# Patient Record
Sex: FEMALE | Race: WHITE | NOT HISPANIC OR LATINO | Employment: OTHER | URBAN - METROPOLITAN AREA
[De-identification: names, ages, dates, MRNs, and addresses within clinical notes are randomized per-mention and may not be internally consistent; named-entity substitution may affect disease eponyms.]

---

## 2024-01-05 ENCOUNTER — TRANSFERRED RECORDS (OUTPATIENT)
Dept: HEALTH INFORMATION MANAGEMENT | Facility: CLINIC | Age: 74
End: 2024-01-05

## 2024-02-05 ENCOUNTER — TRANSFERRED RECORDS (OUTPATIENT)
Dept: HEALTH INFORMATION MANAGEMENT | Facility: CLINIC | Age: 74
End: 2024-02-05
Payer: MEDICARE

## 2024-02-26 ENCOUNTER — TRANSFERRED RECORDS (OUTPATIENT)
Dept: HEALTH INFORMATION MANAGEMENT | Facility: CLINIC | Age: 74
End: 2024-02-26
Payer: MEDICARE

## 2024-02-27 ENCOUNTER — TRANSCRIBE ORDERS (OUTPATIENT)
Dept: OTHER | Age: 74
End: 2024-02-27

## 2024-02-27 DIAGNOSIS — I27.20 PULMONARY HYPERTENSION (H): Primary | ICD-10-CM

## 2024-02-28 ENCOUNTER — PRE VISIT (OUTPATIENT)
Dept: CARDIOLOGY | Facility: CLINIC | Age: 74
End: 2024-02-28
Payer: MEDICARE

## 2024-02-28 NOTE — TELEPHONE ENCOUNTER
Patient Name: Evelyne Cutler Age: 73 year old, female, 1950 MRN: [id number]   Referring Provider:  Edie Appt:         PH Medications:  Sildenafil      Patient History: Pt has a history of permanent atrial fibrillation status post left atrial appendage occluder device, hypertension, chronic kidney disease stage III, GAVE, iron deficiency anemia, former tobacco abuse, severe pulmonary hypertension and severe tricuspid regurgitation       Recently admitted 2/21 for acute heart failure. Inpatient testing for PH completed and diuresis. Dry weight: 164lb (was at 202lb). Patient was having hypotension with sildenafil start and diuresis.     Home oxygen: 1-2lpm. Formerly [facility] medical (ordered at discharge)      Recent Testing:       Date Test Epic Media/Scan Care Everywhere    2/23/24 RHC             111/58/81, RA 12, RV 75/12, PA 75/30/45, PCWP 15. SvO2: 47   CO/CI 4.6/2.5. PVR 6.5 MONROE     Vasodilator (Flolan 0.008 ng/kg/min): NIBP 89/49/66, PA 70/25/40, PCWP   15. SvO2: 50, CO/CI 4.9/2.7. PVR 5.1 MONROE.  []  []   [x]     2/23/24 Echo               * The estimated ejection fraction is 65-70%.     * Left ventricular segmental wall motion is normal.     * The right ventricle is moderately enlarged.     * Moderately reduced right ventricular systolic function.     * There is severe tricuspid regurgitation.     * Severe pulmonary hypertension, estimated pulmonary arterial systolic   pressure is  102 mmHg.     * Prior study from 1/10/2024.  Vyiy-iy-jckn comparison EF still 65%, RV   is   moderately enlarged with moderately reduced function PA pressures still   severely elevated at 100 prior study PA pressures were 90 with dilated RV   with   decreased RV function.                            []  []   [x]     2/21/24 Sleep Study         The patient was monitored for 9 hours and 8 minutes.  The patient's saturations were below 90% for 15.9% of the tested time.  Supplemental oxygen was added.  Some cyclic desaturations  were noted, potentially suggestive of a sleep-related breathing disorder.  The patient ended up on 1 L/minutes nasal cannula.  []  []   [x]     2/22/24 Chest CT        1. Findings of congestive heart failure   2. Negative for pulmonary fibrosis.  The minor atelectasis and bronchiectasis   doubtful clinical relevance   3. Micronodules do not require follow-up unless the patient high risk than 12   month but these are of doubtful clinical relevance   4. The body wall edema, pleural effusions and ascites reflecting the 3rd   spacing and CHF   5. Pulmonary artery hypertension, cardiomegaly and left ventricle exclusion   device   6. Hypoattenuation of liver consistent with steatosis. This associates with   metabolic syndrome, hyperinsulinemia, predisposing to steatohepatitis and   sequelae including cirrhosis and HCC.    []  []   [x]     2/22/24 V/Q Scan    IMPRESSION:   1. Abnormal clumped radiotracer on ventilation scan which may be secondary to   COPD.   2. Multiple matched segmental the fax within the bilateral lungs.  However   distribution favors artifact secondary to retained radiotracer from ventilation   scan rather than true perfusion defects.  []   []   [x]      Abd/Liver US []   []   []      Misc:  []   []   []         []   []   []

## 2024-03-01 ENCOUNTER — PRE VISIT (OUTPATIENT)
Dept: CARDIOLOGY | Facility: CLINIC | Age: 74
End: 2024-03-01
Payer: MEDICARE

## 2024-03-01 NOTE — TELEPHONE ENCOUNTER
RECORDS RECEIVED FROM:    DATE RECEIVED:    GENERAL RECORDS STATUS DETAILS   OFFICE NOTE from cardiologists Care Everywhere 11-29-23 [place]   EKG (STRIPS & REPORTS) Received 2-5-24   ECHOS (IMAGES AND REPORTS) Received 2-23-24   PULMONARY HYPERTENSION      6 MINUTE WALK TEST N/A    PULMONARY FUNCTION TESTS N/A    RIGHT HEART CATH (IMAGES) Received 2-26-24   SLEEP STUDY / OVERNIGHT OXIMETRY Received 2-21-24   XR CHEST   (IMAGES AND REPORTS) Received 2-15-24   CHEST CT  (IMAGES AND REPORTS) Received 2-22-24   V/Q SCAN (IMAGES) Received 2-22-24   LIVER US  (IMAGES AND REPORTS) N/A    ANGIOGRAMS (IMAGES) Received CTA 5-12-23   STRESS TEST   (IMAGES AND REPORTS) N/A      Action 3-1 [facility] Records and Imaging Requested:   Action Taken Cath Img & Rec  ECHO Img & Rec  CT Chest Images  NM Lung Scan Images  Sleep Study Report  XR Chest Images  ECG Strips  CTA Chest Images 2-26-24 and 1-5-23 2-23-24, 1-10-24,  2-15-23  2-22-24  2-22-24  2-21-24  2-15-24  2-5-24 and 1-5-24 5-12-23      3-4 Images confirmed in PACS  Records sent to scanning

## 2024-04-03 ENCOUNTER — TELEPHONE (OUTPATIENT)
Dept: CARDIOLOGY | Facility: CLINIC | Age: 74
End: 2024-04-03
Payer: MEDICARE

## 2024-04-03 NOTE — TELEPHONE ENCOUNTER
Left Voicemail (1st Attempt) for the patient to call back and schedule the following:    Appointment type: hospital follow up   Provider: n/a   Return date: 04/08/24  Specialty phone number: [phone] opt 1  Additional appointment(s) needed: n/a   Additonal Notes: n/a     Dr Goodrich

## 2024-04-09 ENCOUNTER — PRE VISIT (OUTPATIENT)
Dept: CARDIOLOGY | Facility: CLINIC | Age: 74
End: 2024-04-09
Payer: MEDICARE

## 2024-04-09 RX ORDER — DIGOXIN 125 MCG
125 TABLET ORAL
COMMUNITY
Start: 2024-03-01 | End: 2024-04-12

## 2024-04-09 RX ORDER — MIDODRINE HYDROCHLORIDE 2.5 MG/1
2.5 TABLET ORAL
COMMUNITY
Start: 2024-03-01 | End: 2024-04-12

## 2024-04-09 RX ORDER — TORSEMIDE 20 MG/1
60 TABLET ORAL
COMMUNITY
Start: 2024-03-30 | End: 2024-04-25

## 2024-04-09 RX ORDER — METOLAZONE 5 MG/1
5 TABLET ORAL
COMMUNITY
Start: 2024-03-30 | End: 2024-04-12

## 2024-04-09 NOTE — TELEPHONE ENCOUNTER
Patient Name: Evelyne Cutler Age: 73 year old, female, 1950 MRN: [id number]   Referring Provider:  Edie Appt:         PH Medications:  Trialed sildenafil      Patient History: Pt has a history of permanent atrial fibrillation status post left atrial appendage occluder device, hypertension, chronic kidney disease stage III, GAVE, iron deficiency anemia, former tobacco abuse, severe pulmonary hypertension and severe tricuspid regurgitation         Recently admitted 2/21 for acute heart failure. Inpatient testing for PH completed and diuresis. Dry weight: 164lb (was at 202lb). February 26, 2024 which showed an RA of 12 mmHg, PCW of 15 mmHg, PA 71/25 with a mean of 41 with a negative vasodilator study. Her weight at the time of her right heart catheterization was 169 pounds. She was initated on sildenafil and developed hypotension, this was dcd.     Rehospitalized 4/3 for weight gain, shortness of breath and fluid retention   Admit 187lb  Weight 4/9 172lb  Goal weight 169lb  She was placed on digoxin and this was discontinued due to bradycardia      Home oxygen: 1-2lpm. Count includes the [facility] medical (ordered at discharge)    For consideration of contributing issues to her pulmonary pretension a connective tissue disease workup was sent in the setting of an elevated NORRIS of 6. Centromere antibody was found to be greater than 240             Recent Testing:       Date Test Epic Media/Scan Care Everywhere     RHC             111/58/81, RA 12, RV 75/12, PA 75/30/45, PCWP 15. SvO2: 47   CO/CI 4.6/2.5. PVR 6.5 MONROE      Vasodilator (Flolan 0.008 ng/kg/min): NIBP 89/49/66, PA 70/25/40, PCWP   15. SvO2: 50, CO/CI 4.9/2.7. PVR 5.1 MONROE.  []  []   [x]      Angio/Stress []  []   []      Echo               The estimated ejection fraction is 65-70%.     * Left ventricular segmental wall motion is normal.     * The right ventricle is moderately enlarged.     * Moderately reduced right ventricular systolic function.     * There is  severe tricuspid regurgitation.     * Severe pulmonary hypertension, estimated pulmonary arterial systolic   pressure is  102 mmHg.                        []  []   []      Sleep Study    The patient was monitored for 9 hours and 8 minutes.  The patient's saturations were below 90% for 15.9% of the tested time.  Supplemental oxygen was added.  Some cyclic desaturations were noted, potentially suggestive of a sleep-related breathing disorder.  The patient ended up on 1 L/minutes nasal cannula.  []  []   []      Chest CT      1. Findings of congestive heart failure   2. Negative for pulmonary fibrosis.  The minor atelectasis and bronchiectasis   doubtful clinical relevance   3. Micronodules do not require follow-up unless the patient high risk than 12   month but these are of doubtful clinical relevance   4. The body wall edema, pleural effusions and ascites reflecting the 3rd   spacing and CHF   5. Pulmonary artery hypertension, cardiomegaly and left ventricle exclusion   device   6. Hypoattenuation of liver consistent with steatosis. This associates with   metabolic syndrome, hyperinsulinemia, predisposing to steatohepatitis and   sequelae including cirrhosis and HCC.    []  []   []      V/Q Scan    IMPRESSION:   1. Abnormal clumped radiotracer on ventilation scan which may be secondary to   COPD.   2. Multiple matched segmental the fax within the bilateral lungs.  However   distribution favors artifact secondary to retained radiotracer from ventilation   scan rather than true perfusion defects.  []   []   []      Abd/Liver US []   []   []      Misc:  []   []   []         []   []   []

## 2024-04-11 ENCOUNTER — OFFICE VISIT (OUTPATIENT)
Dept: CARDIOLOGY | Facility: CLINIC | Age: 74
End: 2024-04-11
Attending: STUDENT IN AN ORGANIZED HEALTH CARE EDUCATION/TRAINING PROGRAM
Payer: MEDICARE

## 2024-04-11 VITALS
SYSTOLIC BLOOD PRESSURE: 110 MMHG | WEIGHT: 172 LBS | HEART RATE: 70 BPM | DIASTOLIC BLOOD PRESSURE: 61 MMHG | OXYGEN SATURATION: 96 %

## 2024-04-11 DIAGNOSIS — G47.33 OSA (OBSTRUCTIVE SLEEP APNEA): Primary | ICD-10-CM

## 2024-04-11 DIAGNOSIS — R06.09 DOE (DYSPNEA ON EXERTION): ICD-10-CM

## 2024-04-11 DIAGNOSIS — I27.20 PULMONARY HTN (H): ICD-10-CM

## 2024-04-11 PROCEDURE — 99417 PROLNG OP E/M EACH 15 MIN: CPT | Performed by: STUDENT IN AN ORGANIZED HEALTH CARE EDUCATION/TRAINING PROGRAM

## 2024-04-11 PROCEDURE — G0463 HOSPITAL OUTPT CLINIC VISIT: HCPCS | Performed by: STUDENT IN AN ORGANIZED HEALTH CARE EDUCATION/TRAINING PROGRAM

## 2024-04-11 PROCEDURE — 99205 OFFICE O/P NEW HI 60 MIN: CPT | Performed by: STUDENT IN AN ORGANIZED HEALTH CARE EDUCATION/TRAINING PROGRAM

## 2024-04-11 RX ORDER — ACETAMINOPHEN 325 MG/1
325-650 TABLET ORAL
COMMUNITY

## 2024-04-11 RX ORDER — ROPINIROLE 0.25 MG/1
1 TABLET, FILM COATED ORAL AT BEDTIME
COMMUNITY
Start: 2024-04-10 | End: 2024-05-10

## 2024-04-11 RX ORDER — HYDROCORTISONE 10 MG/ML
LOTION TOPICAL
COMMUNITY
Start: 2024-04-10 | End: 2024-05-10

## 2024-04-11 RX ORDER — ECHINACEA PURPUREA EXTRACT 125 MG
2 TABLET ORAL
COMMUNITY
Start: 2024-04-10 | End: 2024-05-10

## 2024-04-11 ASSESSMENT — PAIN SCALES - GENERAL: PAINLEVEL: NO PAIN (0)

## 2024-04-11 NOTE — LETTER
2024       RE: Evelyne Cutler  [address]  [address]  [facility] [address]     Dear Colleague,    Thank you for referring your patient, Evelyne Cutler, to the [facility] [place] [facility]. Please see a copy of my visit note below.    Service Date: 2024    Dione Ruiz, APRN, CNP  [facility]  [address] [place] [address]      RE:  Evelyne Cutler   MRN:  [id number]  :  1950      Dear Ms. Ruiz:    I had the pleasure of seeing Evelyne Cutler at the [facility]. As you know, Ms. Cutler is a very pleasant 73 year old female who presents for evaluation and management of pulmonary hypertension. She has a history of:    Severe tricuspid regurgitation with moderately reduced RV function  Permanent atrial fibrillation status post Watchman implantation  GAVE with iron deficiency anemia  CKD stage 3  TIA  Dyslipidemia  Anxiety  Previous tobacco use    She presents to clinic with her daughter, Temitope, who is a pediatric nurse and her son-in-law.    She started having dyspnea 1.5 years ago that has progressively worsened with increasing lower extremity edema and abdominal distension. She can only ambulate short distances without dyspnea. She lives by herself in an apartment and has dyspnea when performing her ADLs. I would categorize her as WHO functional class IIIB. She has been hospitalized five times this year for decompensated heart failure. She was most recently discharged yesterday (4/10/24) to attend today's appointment with me. She was discharged with torsemide 60 mg daily. She was newly started on supplemental O2 1 L/min after this recent hospitalization. No syncope, presyncope, or chest pain. No history of prior diagnosis of connective tissue disease, congenital heart disease, lung disease, cirrhosis, DVT/PE, or anorexigenic or recreational/illicit  drug use. Had skin cancer that was excised, no need for chemotherapy or radiation.    She has been on multiple diuretic regimens. She developed hypotension with low dose sildenafil 20 mg three times a day. Was previously on midodrine. Had bradycardia with digoxin. Jardiance was stopped due to concern that it may have caused a rash.    Her prior evaluation has included a negative lung perfusion cancer. Echocardiogram in 2/23/24 showed moderately enlarged RV, moderately reduced RV function, severe TR, and normal LVEF. She had a RHC in 2/2024 at [facility] that showed severe precapillary pulmonary hypertension with RAP 12, mPAP 45 mHg, PCWP 14, CO/CI 4.6/2.5, ad PVR 6.5. Flolan 0.008 ng/kg/min was reportedly given (lower than anticipated, uncertain whether this was a documentation error) for vasodilatory testing without response. Her evaluation was also notable for elevated NORRIS and centromere antibody.    PAST MEDICAL HISTORY:  Severe tricuspid regurgitation with moderately reduced RV function  Permanent atrial fibrillation status post Watchman implantation  GAVE with iron deficiency anemia  CKD stage 3  TIA  Dyslipidemia  Anxiety  Previous tobacco use    PAST SURGICAL HISTORY:  Past Surgical History:   Procedure Laterality Date    LYMPH NODE BIOPSY      TUBAL LIGATION         FAMILY HISTORY:  Family History   Problem Relation Age of Onset    Pulmonary Hypertension Mother        SOCIAL HISTORY:  Social History     Socioeconomic History    Marital status: Legally      Spouse name: Not on file    Number of children: Not on file    Years of education: Not on file    Highest education level: Not on file   Occupational History    Not on file   Tobacco Use    Smoking status: Former     Current packs/day: 0.50     Average packs/day: 0.5 packs/day for 52.5 years (26.3 ttl pk-yrs)     Types: Cigarettes     Start date: 10/1971    Smokeless tobacco: Never   Substance and Sexual Activity    Alcohol use: Not Currently     Drug use: Not Currently    Sexual activity: Not on file   Other Topics Concern    Not on file   Social History Narrative    Retired, 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 Lives alone.     Social Determinants of Health     Financial Resource Strain: Low Risk  (2/11/2024)    [facility]    Overall Financial Resource Strain (CARDIA)     Difficulty of Paying Living Expenses: Not very hard   Food Insecurity: No Food Insecurity (4/3/2024)    [facility]    Hunger Vital Sign     Worried About Running Out of Food in the Last Year: Never true     Ran Out of Food in the Last Year: Never true   Transportation Needs: No Transportation Needs (4/3/2024)    [facility]    Transportation Needs     In the past 12 months, has lack of transportation kept you from medical appointments, meetings, work, or from getting medicines or things needed for daily living?: No   Physical Activity: Insufficiently Active (2/11/2024)    [facility]    Exercise Vital Sign     Days of Exercise per Week: 3 days     Minutes of Exercise per Session: 20 min   Stress: No Stress Concern Present (2/11/2024)    [facility]    [employer] - Occupational Stress Questionnaire     Feeling of Stress : Only a little   Social Connections: Unknown (2/11/2024)    [facility] from [facility]    Social Connection and Isolation Panel [NHANES]     Frequency of Communication with Friends and Family: More than three times a week     Frequency of Social Gatherings with Friends and Family: More than three times a week     Attends  Jewish Services: Patient declined     Active Member of Clubs or Organizations: No     Attends Club or Organization Meetings: 1 to 4 times per year     Marital Status:    Interpersonal Safety: Not At Risk (4/3/2024)    [facility]    Intimate Partner Violence     Are you in a relationship where you are physically hurt, threatened and/or made to feel afraid?: No   Housing Stability: Low Risk  (4/3/2024)    [facility]    Housing Stability     In the last 12 months, was there a time when you were not able to pay the mortgage or rent on time?: No     In the last 12 months, how many places have you lived?: 1     Number of Places Lived in the Last Year (Outpatient): Not on file     In the last 12 months, how many places have you lived?: 0 to 2     In the last 12 months, was there a time when you did not have a steady place to sleep or slept in a shelter (including now)?: No       CURRENT MEDICATIONS:  Current Outpatient Medications   Medication Sig Dispense Refill    acetaminophen (TYLENOL) 325 MG tablet Take 325-650 mg by mouth      atorvastatin (LIPITOR) 20 MG tablet Take 1 tablet by mouth at bedtime      ferrous gluconate (FERGON) 324 (38 Fe) MG tablet Take 324 mg by mouth every 48 hours      hydrocortisone (CORTIZONE 10) 1 % external lotion by topical route twice daily.      pantoprazole (PROTONIX) 40 MG EC tablet Take 1 tablet by mouth daily before breakfast      rOPINIRole (REQUIP) 0.25 MG tablet Take 1 tablet by mouth at bedtime      sertraline (ZOLOFT) 50 MG tablet Take 1 tablet by mouth every morning      sodium chloride (OCEAN) 0.65 % nasal spray Spray 2 sprays in nostril      torsemide (DEMADEX) 20 MG tablet Take 60 mg by mouth      vitamin C (ASCORBIC ACID) 500 MG tablet Take 500 mg by mouth every 48 hours       No current facility-administered medications for  this visit.       EXAM:  /61 (BP Location: Left arm, Patient Position: Sitting, Cuff Size: Adult Small)   Pulse 70   Wt 78 kg (172 lb)   SpO2 96%   General: appears comfortable, alert and articulate, sitting in a wheelchair  Head: normocephalic, atraumatic  Eyes: anicteric sclera, EOMI  Neck: no adenopathy  Orophyarynx: moist mucosa, no lesions, dentition intact  Heart: irregularly irregular, normal S1, loud P2, 3/6 systolic ejection murmur in LLSB, estimated JVP 20 cm, 2+ carotid and radial pulses  Lungs: clear to auscultation bilaterally, no rales or wheezing, on 1 L/min O2 by nasal cannula  Abdomen: distended, bowel sounds present, no hepatosplenomegaly  Extremities: 3+ pitting bilateral lower extremity edema to the abdomen  Neurological: normal speech and affect, no gross motor deficits      Echocardiogram 2/23/24 (personally reviewed):  Summary:    * The estimated ejection fraction is 65-70%.     * Left ventricular segmental wall motion is normal.     * The right ventricle is moderately enlarged.     * Moderately reduced right ventricular systolic function.     * There is severe tricuspid regurgitation.     * Severe pulmonary hypertension, estimated pulmonary arterial systolic pressure is  102 mmHg.     * Prior study from 1/10/2024.  Giri-lk-[id number] comparison EF still 65%, RV is moderately enlarged with moderately reduced function PA pressures still severely elevated at 100 prior study PA pressures were 90 with dilated RV   with decreased RV function.     High resolution CT chest 2/22/24 at [facility] (personally reviewed):  1. Findings of congestive heart failure   2. Negative for pulmonary fibrosis.  The minor atelectasis and bronchiectasis   doubtful clinical relevance   3. Micronodules do not require follow-up unless the patient high risk than 12   month but these are of doubtful clinical relevance   4. The body wall edema, pleural effusions and ascites reflecting the 3rd spacing and CHF   5.  Pulmonary artery hypertension, cardiomegaly and left ventricle exclusion   device   6. Hypoattenuation of liver consistent with steatosis. This associates with metabolic syndrome, hyperinsulinemia, predisposing to steatohepatitis and sequelae including cirrhosis and HCC.     NM lung perfusion scan at [facility] 2/22/24 (personally reviewed):  1. Abnormal clumped radiotracer on ventilation scan which may be secondary to COPD.   2. Multiple matched segmental the fax within the bilateral lungs.  However   distribution favors artifact secondary to retained radiotracer from ventilation   scan rather than true perfusion defects.     Overnight oximetry 2/21/24 at [facility]:  Overnight oximetry was performed at the request of Edmar Flaherty MD, on Evelyne Cutler from February 21, 2024 to February 22, 2024.  The patient was monitored for 9 hours and 8 minutes.  The patient's saturations were below 90% for 15.9% of the tested time.  Supplemental oxygen was added.  Some cyclic desaturations were noted, potentially suggestive of a sleep-related breathing disorder.  The patient ended up on 1 L/minutes nasal cannula.     RHC 2/26/24 at [facility]:  RA: 12  RV: 75/12  PA: 75/30 (45)  PCWP: 15  Oli CO/CI: 4.6/2.5  PVR: 6.5    With Flolan 0.008 ng/kg/min (uncertain whether this is correct):  PA: 70/25 (40)  PCWP: 15  CO/CI: 4.9/2.7  PVR: 5.1      Assessment and Plan:     Evelyne Cutler is a very pleasant 73 year old female with a history of severe TR with moderately reduced RV function, permanent atrial fibrillation status post Watchman implantation, GAVE with iron deficiency anemia, and CKD stage 3 who presents for evaluation and management of pulmonary hypertension.     Evaluation of pulmonary hypertension.     Her prior evaluation has included a negative lung perfusion cancer. Echocardiogram in 2/23/24 showed moderately enlarged RV, moderately reduced RV function, severe TR, and normal LVEF. She had a RHC in 2/2024 at  [facility] that showed severe precapillary pulmonary hypertension with RAP 12, mPAP 45 mHg, PCWP 14, CO/CI 4.6/2.5, ad PVR 6.5. Flolan 0.008 ng/kg/min was reportedly given (lower than anticipated, uncertain whether this was a documentation error) for vasodilatory testing without response. Her evaluation was also notable for elevated NORRIS and centromere antibody.    I would like to repeat the RHC at [facility] to reassess her hemodynamics and complete vasodilatory testing. As she has had frequent hospitalizations and her overall prognosis is guarded, would like to complete the RHC as soon as possible as an outpatient. To complete the PH evaluation, would also like to obtain our PH serologic labs, PFTs, sleep study, and 6MWT. PFTs and sleep study can be completed nonurgently. Will also referral her to Rheumatology who she can see locally (through [facility]) as she has a positive NORRIS and centromere antibody. Will also refer her to pulmonary rehab.    She is significantly volume overloaded. I contacted patient's [facility] Cardiology team who is closely monitoring her volume status to recommend more aggressive outpatient diuresis. She is currently on torsemide 60 mg daily, which she just started. Recommend increasing to torsemide 60 mg twice a day and check labs early next week. Our team would be happy to assist with managing outpatient diuretics.    We had a discussion about the challenges in management of severe TR which contributes to worsening RV function, which then begets more TR. She previously had difficulties with low dose vasodilators. She needs aggressive diuresis to offload the weak RV. Will repeat RHC and depending on her hemodynamics, will consider initiating low dose vasodilators with inhaled treprostinil and/or sildenafil 10 mg three times per day.    Follow-up after completion of RHC.      It was a pleasure seeing Evelyne Cutler at the [facility]. Please contact me  with any questions or concerns that you may have.      I spent a total of 80 minutes on the date of service evaluating this patient which included face to face discussion, performing a physical exam, reviewing of the chart to gain information from other providers to obtain further history, personally reviewing prior lab and imaging results, ordering tests and/or medications, and documenting clinical information in the electronic health record.         Sincerely,      Anabel Powell MD, PhD   of Medicine  [facility]  Cardiovascular Division  [facility]

## 2024-04-11 NOTE — LETTER
2024      RE: Evelyne Cutler  [address]  [address]  [facility] [address]       Dear Colleague,    Thank you for the opportunity to participate in the care of your patient, Evelyne Cutler, at the [facility] [place] [facility]. Please see a copy of my visit note below.    Service Date: 2024    Dione Ruiz, APRN, CNP  [facility]  [address] [place] [address]      RE:  Evelyne Cutler   MRN:  [id number]  :  1950      Dear Ms. Ruiz:    I had the pleasure of seeing Evelyne Cutler at the [facility]. As you know, Ms. Cutler is a very pleasant 73 year old female who presents for evaluation and management of pulmonary hypertension. She has a history of:    Severe tricuspid regurgitation with moderately reduced RV function  Permanent atrial fibrillation status post Watchman implantation  GAVE with iron deficiency anemia  CKD stage 3  TIA  Dyslipidemia  Anxiety  Previous tobacco use    She presents to clinic with her daughter, Temitope, who is a pediatric nurse and her son-in-law.    She started having dyspnea 1.5 years ago that has progressively worsened with increasing lower extremity edema and abdominal distension. She can only ambulate short distances without dyspnea. She lives by herself in an apartment and has dyspnea when performing her ADLs. I would categorize her as WHO functional class IIIB. She has been hospitalized five times this year for decompensated heart failure. She was most recently discharged yesterday (4/10/24) to attend today's appointment with me. She was discharged with torsemide 60 mg daily. She was newly started on supplemental O2 1 L/min after this recent hospitalization. No syncope, presyncope, or chest pain. No history of prior diagnosis of connective tissue disease, congenital heart disease, lung disease, cirrhosis, DVT/PE, or  anorexigenic or recreational/illicit drug use. Had skin cancer that was excised, no need for chemotherapy or radiation.    She has been on multiple diuretic regimens. She developed hypotension with low dose sildenafil 20 mg three times a day. Was previously on midodrine. Had bradycardia with digoxin. Jardiance was stopped due to concern that it may have caused a rash.    Her prior evaluation has included a negative lung perfusion cancer. Echocardiogram in 2/23/24 showed moderately enlarged RV, moderately reduced RV function, severe TR, and normal LVEF. She had a RHC in 2/2024 at [facility] that showed severe precapillary pulmonary hypertension with RAP 12, mPAP 45 mHg, PCWP 14, CO/CI 4.6/2.5, ad PVR 6.5. Flolan 0.008 ng/kg/min was reportedly given (lower than anticipated, uncertain whether this was a documentation error) for vasodilatory testing without response. Her evaluation was also notable for elevated NORRIS and centromere antibody.    PAST MEDICAL HISTORY:  Severe tricuspid regurgitation with moderately reduced RV function  Permanent atrial fibrillation status post Watchman implantation  GAVE with iron deficiency anemia  CKD stage 3  TIA  Dyslipidemia  Anxiety  Previous tobacco use    PAST SURGICAL HISTORY:  Past Surgical History:   Procedure Laterality Date    LYMPH NODE BIOPSY      TUBAL LIGATION         FAMILY HISTORY:  Family History   Problem Relation Age of Onset    Pulmonary Hypertension Mother        SOCIAL HISTORY:  Social History     Socioeconomic History    Marital status: Legally      Spouse name: Not on file    Number of children: Not on file    Years of education: Not on file    Highest education level: Not on file   Occupational History    Not on file   Tobacco Use    Smoking status: Former     Current packs/day: 0.50     Average packs/day: 0.5 packs/day for 52.5 years (26.3 ttl pk-yrs)     Types: Cigarettes     Start date: 10/1971    Smokeless tobacco: Never   Substance and Sexual  Activity    Alcohol use: Not Currently    Drug use: Not Currently    Sexual activity: Not on file   Other Topics Concern    Not on file   Social History Narrative    Retired, 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 Lives alone.     Social Determinants of Health     Financial Resource Strain: Low Risk  (2/11/2024)    [facility]    Overall Financial Resource Strain (CARDIA)     Difficulty of Paying Living Expenses: Not very hard   Food Insecurity: No Food Insecurity (4/3/2024)    [facility] from [facility], [facility]    Hunger Vital Sign     Worried About Running Out of Food in the Last Year: Never true     Ran Out of Food in the Last Year: Never true   Transportation Needs: No Transportation Needs (4/3/2024)    Received from [facility], [facility]    Transportation Needs     In the past 12 months, has lack of transportation kept you from medical appointments, meetings, work, or from getting medicines or things needed for daily living?: No   Physical Activity: Insufficiently Active (2/11/2024)    Received from [facility], [facility]    Exercise Vital Sign     Days of Exercise per Week: 3 days     Minutes of Exercise per Session: 20 min   Stress: No Stress Concern Present (2/11/2024)    Received from [facility], [facility]    [employer] [place] [employer] - Occupational Stress Questionnaire     Feeling of Stress : Only a little   Social Connections: Unknown (2/11/2024)    Received from [facility], [facility]    Social Connection and Isolation Panel [NHANES]     Frequency of Communication with Friends and Family: More than three times a week     Frequency of Social Gatherings with Friends and Family: More  than three times a week     Attends Christianity Services: Patient declined     Active Member of Clubs or Organizations: No     Attends Club or Organization Meetings: 1 to 4 times per year     Marital Status:    Interpersonal Safety: Not At Risk (4/3/2024)    [facility], [facility]    Intimate Partner Violence     Are you in a relationship where you are physically hurt, threatened and/or made to feel afraid?: No   Housing Stability: Low Risk  (4/3/2024)    Received from [facility], [facility]    Housing Stability     In the last 12 months, was there a time when you were not able to pay the mortgage or rent on time?: No     In the last 12 months, how many places have you lived?: 1     Number of Places Lived in the Last Year (Outpatient): Not on file     In the last 12 months, how many places have you lived?: 0 to 2     In the last 12 months, was there a time when you did not have a steady place to sleep or slept in a shelter (including now)?: No       CURRENT MEDICATIONS:  Current Outpatient Medications   Medication Sig Dispense Refill    acetaminophen (TYLENOL) 325 MG tablet Take 325-650 mg by mouth      atorvastatin (LIPITOR) 20 MG tablet Take 1 tablet by mouth at bedtime      ferrous gluconate (FERGON) 324 (38 Fe) MG tablet Take 324 mg by mouth every 48 hours      hydrocortisone (CORTIZONE 10) 1 % external lotion by topical route twice daily.      pantoprazole (PROTONIX) 40 MG EC tablet Take 1 tablet by mouth daily before breakfast      rOPINIRole (REQUIP) 0.25 MG tablet Take 1 tablet by mouth at bedtime      sertraline (ZOLOFT) 50 MG tablet Take 1 tablet by mouth every morning      sodium chloride (OCEAN) 0.65 % nasal spray Spray 2 sprays in nostril      torsemide (DEMADEX) 20 MG tablet Take 60 mg by mouth      vitamin C (ASCORBIC ACID) 500 MG tablet Take 500 mg by mouth every 48 hours       No current  facility-administered medications for this visit.       EXAM:  /61 (BP Location: Left arm, Patient Position: Sitting, Cuff Size: Adult Small)   Pulse 70   Wt 78 kg (172 lb)   SpO2 96%   General: appears comfortable, alert and articulate, sitting in a wheelchair  Head: normocephalic, atraumatic  Eyes: anicteric sclera, EOMI  Neck: no adenopathy  Orophyarynx: moist mucosa, no lesions, dentition intact  Heart: irregularly irregular, normal S1, loud P2, 3/6 systolic ejection murmur in LLSB, estimated JVP 20 cm, 2+ carotid and radial pulses  Lungs: clear to auscultation bilaterally, no rales or wheezing, on 1 L/min O2 by nasal cannula  Abdomen: distended, bowel sounds present, no hepatosplenomegaly  Extremities: 3+ pitting bilateral lower extremity edema to the abdomen  Neurological: normal speech and affect, no gross motor deficits      Echocardiogram 2/23/24 (personally reviewed):  Summary:    * The estimated ejection fraction is 65-70%.     * Left ventricular segmental wall motion is normal.     * The right ventricle is moderately enlarged.     * Moderately reduced right ventricular systolic function.     * There is severe tricuspid regurgitation.     * Severe pulmonary hypertension, estimated pulmonary arterial systolic pressure is  102 mmHg.     * Prior study from 1/10/2024.  [id number] comparison EF still 65%, RV is moderately enlarged with moderately reduced function PA pressures still severely elevated at 100 prior study PA pressures were 90 with dilated RV   with decreased RV function.     High resolution CT chest 2/22/24 at [facility] (personally reviewed):  1. Findings of congestive heart failure   2. Negative for pulmonary fibrosis.  The minor atelectasis and bronchiectasis   doubtful clinical relevance   3. Micronodules do not require follow-up unless the patient high risk than 12   month but these are of doubtful clinical relevance   4. The body wall edema, pleural effusions and ascites  reflecting the 3rd spacing and CHF   5. Pulmonary artery hypertension, cardiomegaly and left ventricle exclusion   device   6. Hypoattenuation of liver consistent with steatosis. This associates with metabolic syndrome, hyperinsulinemia, predisposing to steatohepatitis and sequelae including cirrhosis and HCC.     NM lung perfusion scan at [facility] 2/22/24 (personally reviewed):  1. Abnormal clumped radiotracer on ventilation scan which may be secondary to COPD.   2. Multiple matched segmental the fax within the bilateral lungs.  However   distribution favors artifact secondary to retained radiotracer from ventilation   scan rather than true perfusion defects.     Overnight oximetry 2/21/24 at [facility]:  Overnight oximetry was performed at the request of Edmar Flaherty MD, on Evelyne Cutler from February 21, 2024 to February 22, 2024.  The patient was monitored for 9 hours and 8 minutes.  The patient's saturations were below 90% for 15.9% of the tested time.  Supplemental oxygen was added.  Some cyclic desaturations were noted, potentially suggestive of a sleep-related breathing disorder.  The patient ended up on 1 L/minutes nasal cannula.     RHC 2/26/24 at [facility]:  RA: 12  RV: 75/12  PA: 75/30 (45)  PCWP: 15  Oli CO/CI: 4.6/2.5  PVR: 6.5    With Flolan 0.008 ng/kg/min (uncertain whether this is correct):  PA: 70/25 (40)  PCWP: 15  CO/CI: 4.9/2.7  PVR: 5.1      Assessment and Plan:     Evelyne Cutler is a very pleasant 73 year old female with a history of severe TR with moderately reduced RV function, permanent atrial fibrillation status post Watchman implantation, GAVE with iron deficiency anemia, and CKD stage 3 who presents for evaluation and management of pulmonary hypertension.     Evaluation of pulmonary hypertension.     Her prior evaluation has included a negative lung perfusion cancer. Echocardiogram in 2/23/24 showed moderately enlarged RV, moderately reduced RV function, severe TR, and normal  LVEF. She had a RHC in 2/2024 at [facility] that showed severe precapillary pulmonary hypertension with RAP 12, mPAP 45 mHg, PCWP 14, CO/CI 4.6/2.5, ad PVR 6.5. Flolan 0.008 ng/kg/min was reportedly given (lower than anticipated, uncertain whether this was a documentation error) for vasodilatory testing without response. Her evaluation was also notable for elevated NORRIS and centromere antibody.    I would like to repeat the RHC at [facility] to reassess her hemodynamics and complete vasodilatory testing. As she has had frequent hospitalizations and her overall prognosis is guarded, would like to complete the RHC as soon as possible as an outpatient. To complete the PH evaluation, would also like to obtain our PH serologic labs, PFTs, sleep study, and 6MWT. PFTs and sleep study can be completed nonurgently. Will also referral her to Rheumatology who she can see locally (through [facility]) as she has a positive NORRIS and centromere antibody. Will also refer her to pulmonary rehab.    She is significantly volume overloaded. I contacted patient's [facility] Cardiology team who is closely monitoring her volume status to recommend more aggressive outpatient diuresis. She is currently on torsemide 60 mg daily, which she just started. Recommend increasing to torsemide 60 mg twice a day and check labs early next week. Our team would be happy to assist with managing outpatient diuretics.    We had a discussion about the challenges in management of severe TR which contributes to worsening RV function, which then begets more TR. She previously had difficulties with low dose vasodilators. She needs aggressive diuresis to offload the weak RV. Will repeat RHC and depending on her hemodynamics, will consider initiating low dose vasodilators with inhaled treprostinil and/or sildenafil 10 mg three times per day.    Follow-up after completion of RHC.      It was a pleasure seeing Evelyne Cutler at the [facility]. Please contact me with any questions or concerns that you may have.      I spent a total of 80 minutes on the date of service evaluating this patient which included face to face discussion, performing a physical exam, reviewing of the chart to gain information from other providers to obtain further history, personally reviewing prior lab and imaging results, ordering tests and/or medications, and documenting clinical information in the electronic health record.         Sincerely,      Anabel Powell MD, PhD   of Medicine  [facility]  Cardiovascular Division  [facility]

## 2024-04-11 NOTE — PROGRESS NOTES
Service Date: 2024    Dione Ruiz, APRN, CNP  [facility]  [address] [place] [address]      RE:  Evelyne Cutler   MRN:  [id number]  :  1950      Dear Ms. Ruiz:    I had the pleasure of seeing Evelyne Cutler at the [facility]. As you know, Ms. Cutler is a very pleasant 73 year old female who presents for evaluation and management of pulmonary hypertension. She has a history of:    Severe tricuspid regurgitation with moderately reduced RV function  Permanent atrial fibrillation status post Watchman implantation  GAVE with iron deficiency anemia  CKD stage 3  TIA  Dyslipidemia  Anxiety  Previous tobacco use    She presents to clinic with her daughter, Temitope, who is a pediatric nurse and her son-in-law.    She started having dyspnea 1.5 years ago that has progressively worsened with increasing lower extremity edema and abdominal distension. She can only ambulate short distances without dyspnea. She lives by herself in an apartment and has dyspnea when performing her ADLs. I would categorize her as WHO functional class IIIB. She has been hospitalized five times this year for decompensated heart failure. She was most recently discharged yesterday (4/10/24) to attend today's appointment with me. She was discharged with torsemide 60 mg daily. She was newly started on supplemental O2 1 L/min after this recent hospitalization. No syncope, presyncope, or chest pain. No history of prior diagnosis of connective tissue disease, congenital heart disease, lung disease, cirrhosis, DVT/PE, or anorexigenic or recreational/illicit drug use. Had skin cancer that was excised, no need for chemotherapy or radiation.    She has been on multiple diuretic regimens. She developed hypotension with low dose sildenafil 20 mg three times a day. Was previously on midodrine. Had bradycardia with digoxin. Jardiance was stopped due to concern that it  may have caused a rash.    Her prior evaluation has included a negative lung perfusion scan. Echocardiogram in 2/23/24 showed moderately enlarged RV, moderately reduced RV function, severe TR, and normal LVEF. She had a RHC in 2/2024 at [facility] that showed severe precapillary pulmonary hypertension with RAP 12, mPAP 45 mHg, PCWP 14, CO/CI 4.6/2.5, ad PVR 6.5. Flolan 0.008 ng/kg/min was reportedly given (dose lower than anticipated, uncertain whether this was a documentation error) for vasodilatory testing without response. Her evaluation was also notable for elevated NORRIS and centromere antibody.    PAST MEDICAL HISTORY:  Severe tricuspid regurgitation with moderately reduced RV function  Permanent atrial fibrillation status post Watchman implantation  GAVE with iron deficiency anemia  CKD stage 3  TIA  Dyslipidemia  Anxiety  Previous tobacco use    PAST SURGICAL HISTORY:  Past Surgical History:   Procedure Laterality Date    LYMPH NODE BIOPSY      TUBAL LIGATION         FAMILY HISTORY:  Family History   Problem Relation Age of Onset    Pulmonary Hypertension Mother        SOCIAL HISTORY:  Social History     Socioeconomic History    Marital status: Legally      Spouse name: Not on file    Number of children: Not on file    Years of education: Not on file    Highest education level: Not on file   Occupational History    Not on file   Tobacco Use    Smoking status: Former     Current packs/day: 0.50     Average packs/day: 0.5 packs/day for 52.5 years (26.3 ttl pk-yrs)     Types: Cigarettes     Start date: 10/1971    Smokeless tobacco: Never   Substance and Sexual Activity    Alcohol use: Not Currently    Drug use: Not Currently    Sexual activity: Not on file   Other Topics Concern    Not on file   Social History Narrative    Retired, 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 Lives alone.     Social Determinants of Health     Financial Resource Strain: Low Risk  (2/11/2024)    [facility]    Overall Financial Resource Strain (CARDIA)     Difficulty of Paying Living Expenses: Not very hard   Food Insecurity: No Food Insecurity (4/3/2024)    [facility]    Hunger Vital Sign     Worried About Running Out of Food in the Last Year: Never true     Ran Out of Food in the Last Year: Never true   Transportation Needs: No Transportation Needs (4/3/2024)    [facility]    Transportation Needs     In the past 12 months, has lack of transportation kept you from medical appointments, meetings, work, or from getting medicines or things needed for daily living?: No   Physical Activity: Insufficiently Active (2/11/2024)    [facility]    Exercise Vital Sign     Days of Exercise per Week: 3 days     Minutes of Exercise per Session: 20 min   Stress: No Stress Concern Present (2/11/2024)    [facility]    [employer] - Occupational Stress Questionnaire     Feeling of Stress : Only a little   Social Connections: Unknown (2/11/2024)    [facility]    Social Connection and Isolation Panel [NHANES]     Frequency of Communication with Friends and Family: More than three times a week     Frequency of Social Gatherings with Friends and Family: More than three times a week     Attends Worship Services: Patient declined     Active Member of Clubs or Organizations: No     Attends Club or Organization Meetings: 1 to 4 times per year     Marital Status:    Interpersonal Safety: Not At Risk (4/3/2024)    [facility]     Intimate Partner Violence     Are you in a relationship where you are physically hurt, threatened and/or made to feel afraid?: No   Housing Stability: Low Risk  (4/3/2024)    [facility]    Housing Stability     In the last 12 months, was there a time when you were not able to pay the mortgage or rent on time?: No     In the last 12 months, how many places have you lived?: 1     Number of Places Lived in the Last Year (Outpatient): Not on file     In the last 12 months, how many places have you lived?: 0 to 2     In the last 12 months, was there a time when you did not have a steady place to sleep or slept in a shelter (including now)?: No       CURRENT MEDICATIONS:  Current Outpatient Medications   Medication Sig Dispense Refill    acetaminophen (TYLENOL) 325 MG tablet Take 325-650 mg by mouth      atorvastatin (LIPITOR) 20 MG tablet Take 1 tablet by mouth at bedtime      ferrous gluconate (FERGON) 324 (38 Fe) MG tablet Take 324 mg by mouth every 48 hours      hydrocortisone (CORTIZONE 10) 1 % external lotion by topical route twice daily.      pantoprazole (PROTONIX) 40 MG EC tablet Take 1 tablet by mouth daily before breakfast      rOPINIRole (REQUIP) 0.25 MG tablet Take 1 tablet by mouth at bedtime      sertraline (ZOLOFT) 50 MG tablet Take 1 tablet by mouth every morning      sodium chloride (OCEAN) 0.65 % nasal spray Spray 2 sprays in nostril      torsemide (DEMADEX) 20 MG tablet Take 60 mg by mouth      vitamin C (ASCORBIC ACID) 500 MG tablet Take 500 mg by mouth every 48 hours       No current facility-administered medications for this visit.       EXAM:  /61 (BP Location: Left arm, Patient Position: Sitting, Cuff Size: Adult Small)   Pulse 70   Wt 78 kg (172 lb)   SpO2 96%   General: appears comfortable, alert and articulate, sitting in a wheelchair  Head: normocephalic, atraumatic  Eyes: anicteric sclera, EOMI  Neck: no  adenopathy  Orophyarynx: moist mucosa, no lesions, dentition intact  Heart: irregularly irregular, normal S1, loud P2, 3/6 systolic ejection murmur in LLSB, estimated JVP 20 cm, 2+ carotid and radial pulses  Lungs: clear to auscultation bilaterally, no rales or wheezing, on 1 L/min O2 by nasal cannula  Abdomen: distended, bowel sounds present, no hepatosplenomegaly  Extremities: 3+ pitting bilateral lower extremity edema to the abdomen  Neurological: normal speech and affect, no gross motor deficits      Echocardiogram 2/23/24 (personally reviewed):  Summary:    * The estimated ejection fraction is 65-70%.     * Left ventricular segmental wall motion is normal.     * The right ventricle is moderately enlarged.     * Moderately reduced right ventricular systolic function.     * There is severe tricuspid regurgitation.     * Severe pulmonary hypertension, estimated pulmonary arterial systolic pressure is  102 mmHg.     * Prior study from 1/10/2024.  [id number] comparison EF still 65%, RV is moderately enlarged with moderately reduced function PA pressures still severely elevated at 100 prior study PA pressures were 90 with dilated RV   with decreased RV function.     High resolution CT chest 2/22/24 at [facility] (personally reviewed):  1. Findings of congestive heart failure   2. Negative for pulmonary fibrosis.  The minor atelectasis and bronchiectasis   doubtful clinical relevance   3. Micronodules do not require follow-up unless the patient high risk than 12   month but these are of doubtful clinical relevance   4. The body wall edema, pleural effusions and ascites reflecting the 3rd spacing and CHF   5. Pulmonary artery hypertension, cardiomegaly and left ventricle exclusion   device   6. Hypoattenuation of liver consistent with steatosis. This associates with metabolic syndrome, hyperinsulinemia, predisposing to steatohepatitis and sequelae including cirrhosis and HCC.     NM lung perfusion scan at [facility]  2/22/24 (personally reviewed):  1. Abnormal clumped radiotracer on ventilation scan which may be secondary to COPD.   2. Multiple matched segmental the fax within the bilateral lungs.  However   distribution favors artifact secondary to retained radiotracer from ventilation   scan rather than true perfusion defects.     Overnight oximetry 2/21/24 at [facility]:  Overnight oximetry was performed at the request of Edmar Flaherty MD, on Evelyne Cutler from February 21, 2024 to February 22, 2024.  The patient was monitored for 9 hours and 8 minutes.  The patient's saturations were below 90% for 15.9% of the tested time.  Supplemental oxygen was added.  Some cyclic desaturations were noted, potentially suggestive of a sleep-related breathing disorder.  The patient ended up on 1 L/minutes nasal cannula.     RHC 2/26/24 at [facility]:  RA: 12  RV: 75/12  PA: 75/30 (45)  PCWP: 15  Oli CO/CI: 4.6/2.5  PVR: 6.5    With Flolan 0.008 ng/kg/min (uncertain whether this is correct):  PA: 70/25 (40)  PCWP: 15  CO/CI: 4.9/2.7  PVR: 5.1      Assessment and Plan:     Evelyne Cutler is a very pleasant 73 year old female with a history of severe TR with moderately reduced RV function, permanent atrial fibrillation status post Watchman implantation, GAVE with iron deficiency anemia, and CKD stage 3 who presents for evaluation and management of pulmonary hypertension.     Evaluation of pulmonary hypertension.     Her prior evaluation has included a negative lung perfusion scan. Echocardiogram in 2/23/24 showed moderately enlarged RV, moderately reduced RV function, severe TR, and normal LVEF. She had a RHC in 2/2024 at [facility] that showed severe precapillary pulmonary hypertension with RAP 12, mPAP 45 mHg, PCWP 14, CO/CI 4.6/2.5, ad PVR 6.5. Flolan 0.008 ng/kg/min was reportedly given (dose lower than anticipated, uncertain whether this was a documentation error) for vasodilatory testing without response. Her evaluation was also  notable for elevated NORRIS and centromere antibody.    I would like to repeat the RHC at [facility] to reassess her hemodynamics and complete vasodilatory testing. As she has had frequent hospitalizations and her overall prognosis is guarded, would like to complete the RHC as soon as possible as an outpatient. To complete the PH evaluation, would also like to obtain our PH serologic labs, PFTs, sleep study, and 6MWT. PFTs and sleep study can be completed nonurgently. Will also referral her to Rheumatology who she can see locally (through [facility]) as she has a positive NORRIS and centromere antibody. Will also refer her to pulmonary rehab.    She is significantly volume overloaded. I contacted patient's [facility] Cardiology team who is closely monitoring her volume status to recommend more aggressive outpatient diuresis. She is currently on torsemide 60 mg daily, which she just started. Recommend increasing to torsemide 60 mg twice a day and check labs early next week. Our team would be happy to assist with managing outpatient diuretics.    We had a discussion about the challenges in management of severe TR which contributes to worsening RV function, which then begets more TR. She previously had difficulties with low dose vasodilators. She needs aggressive diuresis to offload the weak RV. Will repeat RHC and depending on her hemodynamics, will consider initiating low dose vasodilators with inhaled treprostinil and/or sildenafil 10 mg three times per day.    Follow-up after completion of RHC.      It was a pleasure seeing Evelyne Cutler at the [facility]. Please contact me with any questions or concerns that you may have.      I spent a total of 80 minutes on the date of service evaluating this patient which included face to face discussion, performing a physical exam, reviewing of the chart to gain information from other providers to obtain further history, personally reviewing  prior lab and imaging results, ordering tests and/or medications, and documenting clinical information in the electronic health record.         Sincerely,      Anabel Powell MD, PhD   of Medicine  Center for Pulmonary Hypertension  Cardiovascular Division  [facility]

## 2024-04-11 NOTE — PATIENT INSTRUCTIONS
You were seen today in the Pulmonary Hypertension Clinic at the [facility].     Cardiology Provider you saw during your visit:    Dr. Powell    Medication Changes:  None    Recommendations:   Complete the following testing  Sleep study @ [facility]- faxed  PFT's @ [facility]fax[facility]  RHC w/Vaso & 6mwt @ [facility] only  Rheumatology Referral @ [facility]  faxed  Labs today  Pulm Rehab @ [facility]  faxed    Follow-up:   Follow up with Dr. Powell after all testing is complete    Please call us immediately if you have any syncope (fainting or passing out), chest pain, edema (swelling or weight gain), or decline in your functional status (general decline in how you are feeling).    If you have emergent concerns after hours or on the weekend, please call our on-call Cardiologist at [phone], option [phone]. For emergencies call [phone].     Thank you for allowing us to be a part of your care here at the [facility]    If you have questions or concerns please contact us at:    Tanvi Flanagan RN (P: [phone])    Nurse Coordinator       Pulmonary Hypertension     [facility]         SUZAN Gtz   (Prior Auths & Pt Assistance)   ()  Clinic   Clinic   Pulmonary Hypertension   Pulmonary Hypertension  [facility]  (P)[phone]    (P) [phone]  (F) [phone]

## 2024-04-12 RX ORDER — LIDOCAINE 40 MG/G
CREAM TOPICAL
Status: CANCELLED | OUTPATIENT
Start: 2024-04-12

## 2024-04-12 NOTE — NURSING NOTE
"Plan as patient understands:  Complete the following testing  Sleep study @ [facility]- faxed  PFT's @ [facility]fax[facility]  [facility] w/Vaso & 6mwt @ [facility] only  Rheumatology Referral @ [facility]  faxed  Labs today  Pulm Rehab @ [facility]  faxed    Follow-up:   Follow up with Dr. Powell after all testing is complete    ========================  Reviewed Med list  Completed AVS  Follow-up orders placed  Marked chart \"Ready for Checkout\"  Assisted patient in resetting [facility] password  Orders faxed to numerous [facility] offices for services.  Drimki messages sent to patient with names & phone numbers of [facility] offices where orders were faxed per conversation in clinic.  Patient verbalized understanding, agreed with plan and denied any further questions. Melinda Braxton RN on 4/12/2024 at 12:13 PM    "

## 2024-04-12 NOTE — NURSING NOTE
Visit addended to include documentation    Complete the following testing  Sleep study @ [facility]- faxed  PFT's @ [facility]faxed  RHC w/Vaso & 6mwt @ the [facility] only  Rheumatology Referral @ [facility]  faxed  Labs today  Pulm Rehab @ [facility]  faxed    Orders for [facility] faxed to cardiology Dione Lutz, APRN,CNP   [address] [place] [address] [phone] (Work)   [phone] (Fax)     Message sent to  for RHC with vaso, 6MWT and follow-up with Andre Flanagan RN on 4/12/2024 at 11:23 AM

## 2024-04-15 ENCOUNTER — TELEPHONE (OUTPATIENT)
Dept: CARDIOLOGY | Facility: CLINIC | Age: 74
End: 2024-04-15
Payer: MEDICARE

## 2024-04-15 NOTE — TELEPHONE ENCOUNTER
[facility] Call Center    Phone Message    May a detailed message be left on voicemail: yes     Reason for Call: Other: Please call pt back.  She returned the call from Dr. Powell's team member but did not catch the phone number or name of who called.  Thank you     Action Taken: Message routed to:  [facility] ([facility] cardio    Travel Screening: Not Applicable    Thank you!  [facility]

## 2024-04-15 NOTE — TELEPHONE ENCOUNTER
Called and LVM we did schedule URGENT RHC on 4/30. We just need to confirm and see if she can do the walk same day or come back on another day for walk/marva in person     Eve Weber  Clinic    Pulmonary Hypertension   [facility]  (p) [phone]

## 2024-04-16 ENCOUNTER — TELEPHONE (OUTPATIENT)
Dept: CARDIOLOGY | Facility: CLINIC | Age: 74
End: 2024-04-16
Payer: MEDICARE

## 2024-04-16 NOTE — TELEPHONE ENCOUNTER
Received a call from Gloria VALENCIA with thien in [place]. Creatinine increased to 2.76 and hemoglobin is down t 6.3.     Talked with Dr. Powell. Advised to admit to [facility] for diuresis and monitoring. Transfer to the [facility] if needed if patient declines.     Called Gloria back to update plan. They will send patient to [facility].     Patient does have a RHC scheduled 4/25    Tanvi Flanagan RN on 4/16/2024 at 2:09 PM

## 2024-04-16 NOTE — TELEPHONE ENCOUNTER
[facility] Call Center    Phone Message    May a detailed message be left on voicemail: yes     Reason for Call: Other: Pt would like a call back to discuss the weight gain as she has gained about 8 pounds in less then a week and they are very concerned and are wondering if she should go to urgent care asap     Action Taken: Other: Cardio    Travel Screening: Not Applicable

## 2024-04-16 NOTE — TELEPHONE ENCOUNTER
Called patient back. She is 177lb today. This is up from 4/11 172lb. Discussed her local cardiologist Dione Ruiz team will manage fluid volume at this time. This was the plan per Dr. Powell and Dione Ruiz. Patient will call [facility] for this update    Tanvi Flanagan RN on 4/16/2024 at 8:50 AM

## 2024-04-16 NOTE — TELEPHONE ENCOUNTER
Cath Lab Case Request/Order    Location: [address] [address] [facility] Waiting Room    Procedure: Right Heart Cath (RHC)    Procedure Date: 4/25    Patient Arrival Time: 10 AM    Procedure Time: 3rd case to follow    Ordering Provider: Dr. Powell    Performing Cardiologist: Dr. Tierra Rivera    Inpatient Bed Needed: No    Post-  Procedure TAMIKA appointment scheduled (1 - 2 weeks): N/A, RHC     Date: 5/2     Provider: Dr. Powell    Communicated Patient Instructions:  NURSE TO COMMUNICATE       Appointment was scheduled: Over the phone    Patient expressed understanding of above instructions and denied further questions at this time.    FYI, pt declined 6 mwt test because she is not able to walk very far.     melyssa Grover      Pt has co back pain after moving furniture yesterday.

## 2024-04-22 ENCOUNTER — TELEPHONE (OUTPATIENT)
Dept: CARDIOLOGY | Facility: CLINIC | Age: 74
End: 2024-04-22
Payer: MEDICARE

## 2024-04-22 NOTE — TELEPHONE ENCOUNTER
Called patient to discuss pre-procedure instructions  Patient denied further questions, verbalized understanding of instructions.      Pre-procedure instructions - Right heart catheterization      Your arrival time is 4/25 10AM.  Location is [address] Waiting Room  . This is on the first floor down the hooks from the entrance. You can  park or there is a parking ramp near by.   You have a follow-up 5/2 virtual with Dr. Powell at 8:15  Please plan on being at the hospital all day.  At any time, emergencies and/or urgent cases may come up which could delay the start of your procedure.    Pre-procedure instructions - Right heart catheterization  No solid food for 8 hours prior  Nothing to drink 2 hours prior to arrival time  You can take your morning medications (except diabetic and blood thinners) with sips of water

## 2024-04-25 ENCOUNTER — APPOINTMENT (OUTPATIENT)
Dept: MEDSURG UNIT | Facility: CLINIC | Age: 74
End: 2024-04-25
Attending: INTERNAL MEDICINE
Payer: MEDICARE

## 2024-04-25 ENCOUNTER — HOSPITAL ENCOUNTER (OUTPATIENT)
Facility: CLINIC | Age: 74
Discharge: HOME | End: 2024-04-25
Attending: INTERNAL MEDICINE | Admitting: INTERNAL MEDICINE
Payer: MEDICARE

## 2024-04-25 ENCOUNTER — CARE COORDINATION (OUTPATIENT)
Dept: CARDIOLOGY | Facility: CLINIC | Age: 74
End: 2024-04-25

## 2024-04-25 ENCOUNTER — APPOINTMENT (OUTPATIENT)
Dept: LAB | Facility: CLINIC | Age: 74
End: 2024-04-25
Attending: INTERNAL MEDICINE
Payer: MEDICARE

## 2024-04-25 ENCOUNTER — TELEPHONE (OUTPATIENT)
Dept: CARDIOLOGY | Facility: CLINIC | Age: 74
End: 2024-04-25

## 2024-04-25 VITALS
WEIGHT: 169.53 LBS | BODY MASS INDEX: 28.25 KG/M2 | HEART RATE: 88 BPM | HEIGHT: 65 IN | DIASTOLIC BLOOD PRESSURE: 139 MMHG | RESPIRATION RATE: 20 BRPM | OXYGEN SATURATION: 95 % | TEMPERATURE: 97.9 F | SYSTOLIC BLOOD PRESSURE: 160 MMHG

## 2024-04-25 DIAGNOSIS — I27.20 PULMONARY HTN (H): ICD-10-CM

## 2024-04-25 DIAGNOSIS — R79.1 ABNORMAL COAGULATION PROFILE: ICD-10-CM

## 2024-04-25 DIAGNOSIS — R79.9 ABNORMAL FINDING OF BLOOD CHEMISTRY, UNSPECIFIED: ICD-10-CM

## 2024-04-25 DIAGNOSIS — R06.09 DOE (DYSPNEA ON EXERTION): ICD-10-CM

## 2024-04-25 DIAGNOSIS — Z11.59 ENCOUNTER FOR SCREENING FOR OTHER VIRAL DISEASES: ICD-10-CM

## 2024-04-25 DIAGNOSIS — R06.09 DOE (DYSPNEA ON EXERTION): Primary | ICD-10-CM

## 2024-04-25 DIAGNOSIS — Z11.4 ENCOUNTER FOR SCREENING FOR HUMAN IMMUNODEFICIENCY VIRUS (HIV): ICD-10-CM

## 2024-04-25 LAB
ALBUMIN SERPL BCG-MCNC: 3.7 G/DL (ref 3.5–5.2)
ALBUMIN SERPL BCG-MCNC: 3.7 G/DL (ref 3.5–5.2)
ALP SERPL-CCNC: 215 U/L (ref 40–150)
ALP SERPL-CCNC: 215 U/L (ref 40–150)
ALT SERPL W P-5'-P-CCNC: 16 U/L (ref 0–50)
ALT SERPL W P-5'-P-CCNC: 16 U/L (ref 0–50)
ANION GAP SERPL CALCULATED.3IONS-SCNC: 11 MMOL/L (ref 7–15)
ANION GAP SERPL CALCULATED.3IONS-SCNC: 11 MMOL/L (ref 7–15)
AST SERPL W P-5'-P-CCNC: 35 U/L (ref 0–45)
AST SERPL W P-5'-P-CCNC: 35 U/L (ref 0–45)
BASOPHILS # BLD AUTO: 0.1 10E3/UL (ref 0–0.2)
BASOPHILS NFR BLD AUTO: 1 %
BILIRUB DIRECT SERPL-MCNC: 0.65 MG/DL (ref 0–0.3)
BILIRUB SERPL-MCNC: 1.1 MG/DL
BILIRUB SERPL-MCNC: 1.1 MG/DL
BUN SERPL-MCNC: 39.9 MG/DL (ref 8–23)
BUN SERPL-MCNC: 39.9 MG/DL (ref 8–23)
CALCIUM SERPL-MCNC: 8.4 MG/DL (ref 8.8–10.2)
CALCIUM SERPL-MCNC: 8.4 MG/DL (ref 8.8–10.2)
CHLORIDE SERPL-SCNC: 96 MMOL/L (ref 98–107)
CHLORIDE SERPL-SCNC: 96 MMOL/L (ref 98–107)
CHOLEST SERPL-MCNC: 68 MG/DL
CREAT SERPL-MCNC: 1.52 MG/DL (ref 0.51–0.95)
CREAT SERPL-MCNC: 1.52 MG/DL (ref 0.51–0.95)
CRP SERPL-MCNC: 14.8 MG/L
DEPRECATED HCO3 PLAS-SCNC: 29 MMOL/L (ref 22–29)
DEPRECATED HCO3 PLAS-SCNC: 29 MMOL/L (ref 22–29)
EGFRCR SERPLBLD CKD-EPI 2021: 36 ML/MIN/1.73M2
EGFRCR SERPLBLD CKD-EPI 2021: 36 ML/MIN/1.73M2
EOSINOPHIL # BLD AUTO: 0.4 10E3/UL (ref 0–0.7)
EOSINOPHIL NFR BLD AUTO: 6 %
ERYTHROCYTE [DISTWIDTH] IN BLOOD BY AUTOMATED COUNT: 17.4 % (ref 10–15)
GLUCOSE SERPL-MCNC: 101 MG/DL (ref 70–99)
GLUCOSE SERPL-MCNC: 101 MG/DL (ref 70–99)
HBV SURFACE AG SERPL QL IA: NONREACTIVE
HCT VFR BLD AUTO: 22.8 % (ref 35–47)
HCV AB SERPL QL IA: NONREACTIVE
HDLC SERPL-MCNC: 34 MG/DL
HGB BLD-MCNC: 6.7 G/DL (ref 11.7–15.7)
HGB BLD-MCNC: 7.1 G/DL (ref 11.7–15.7)
IMM GRANULOCYTES # BLD: 0 10E3/UL
IMM GRANULOCYTES NFR BLD: 1 %
INR PPP: 1.23 (ref 0.85–1.15)
IRON BINDING CAPACITY (ROCHE): 380 UG/DL (ref 240–430)
IRON SATN MFR SERPL: 7 % (ref 15–46)
IRON SERPL-MCNC: 28 UG/DL (ref 37–145)
LDLC SERPL CALC-MCNC: 17 MG/DL
LYMPHOCYTES # BLD AUTO: 0.6 10E3/UL (ref 0.8–5.3)
LYMPHOCYTES NFR BLD AUTO: 10 %
MCH RBC QN AUTO: 27.4 PG (ref 26.5–33)
MCHC RBC AUTO-ENTMCNC: 31.1 G/DL (ref 31.5–36.5)
MCV RBC AUTO: 88 FL (ref 78–100)
MONOCYTES # BLD AUTO: 0.6 10E3/UL (ref 0–1.3)
MONOCYTES NFR BLD AUTO: 11 %
NEUTROPHILS # BLD AUTO: 4.2 10E3/UL (ref 1.6–8.3)
NEUTROPHILS NFR BLD AUTO: 71 %
NONHDLC SERPL-MCNC: 34 MG/DL
NRBC # BLD AUTO: 0 10E3/UL
NRBC BLD AUTO-RTO: 0 /100
NT-PROBNP SERPL-MCNC: 6252 PG/ML (ref 0–900)
NT-PROBNP SERPL-MCNC: 6383 PG/ML (ref 0–900)
PLATELET # BLD AUTO: 214 10E3/UL (ref 150–450)
POTASSIUM SERPL-SCNC: 3.1 MMOL/L (ref 3.4–5.3)
POTASSIUM SERPL-SCNC: 3.1 MMOL/L (ref 3.4–5.3)
PROT SERPL-MCNC: 6.6 G/DL (ref 6.4–8.3)
PROT SERPL-MCNC: 6.6 G/DL (ref 6.4–8.3)
RBC # BLD AUTO: 2.59 10E6/UL (ref 3.8–5.2)
RHEUMATOID FACT SERPL-ACNC: 15 IU/ML
SODIUM SERPL-SCNC: 136 MMOL/L (ref 135–145)
SODIUM SERPL-SCNC: 136 MMOL/L (ref 135–145)
TRIGL SERPL-MCNC: 83 MG/DL
TSH SERPL DL<=0.005 MIU/L-ACNC: 3.55 UIU/ML (ref 0.3–4.2)
WBC # BLD AUTO: 5.9 10E3/UL (ref 4–11)

## 2024-04-25 PROCEDURE — 87340 HEPATITIS B SURFACE AG IA: CPT | Performed by: STUDENT IN AN ORGANIZED HEALTH CARE EDUCATION/TRAINING PROGRAM

## 2024-04-25 PROCEDURE — 999N000132 HC STATISTIC PP CARE STAGE 1

## 2024-04-25 PROCEDURE — 86803 HEPATITIS C AB TEST: CPT | Performed by: STUDENT IN AN ORGANIZED HEALTH CARE EDUCATION/TRAINING PROGRAM

## 2024-04-25 PROCEDURE — 36415 COLL VENOUS BLD VENIPUNCTURE: CPT | Performed by: STUDENT IN AN ORGANIZED HEALTH CARE EDUCATION/TRAINING PROGRAM

## 2024-04-25 PROCEDURE — 84443 ASSAY THYROID STIM HORMONE: CPT | Performed by: STUDENT IN AN ORGANIZED HEALTH CARE EDUCATION/TRAINING PROGRAM

## 2024-04-25 PROCEDURE — C1751 CATH, INF, PER/CENT/MIDLINE: HCPCS | Performed by: INTERNAL MEDICINE

## 2024-04-25 PROCEDURE — 93451 RIGHT HEART CATH: CPT | Mod: 26 | Performed by: INTERNAL MEDICINE

## 2024-04-25 PROCEDURE — 272N000001 HC OR GENERAL SUPPLY STERILE: Performed by: INTERNAL MEDICINE

## 2024-04-25 PROCEDURE — 999N000142 HC STATISTIC PROCEDURE PREP ONLY

## 2024-04-25 PROCEDURE — 250N000009 HC RX 250: Performed by: STUDENT IN AN ORGANIZED HEALTH CARE EDUCATION/TRAINING PROGRAM

## 2024-04-25 PROCEDURE — 86140 C-REACTIVE PROTEIN: CPT | Performed by: STUDENT IN AN ORGANIZED HEALTH CARE EDUCATION/TRAINING PROGRAM

## 2024-04-25 PROCEDURE — 86431 RHEUMATOID FACTOR QUANT: CPT | Performed by: STUDENT IN AN ORGANIZED HEALTH CARE EDUCATION/TRAINING PROGRAM

## 2024-04-25 PROCEDURE — 250N000009 HC RX 250: Performed by: INTERNAL MEDICINE

## 2024-04-25 PROCEDURE — 80048 BASIC METABOLIC PNL TOTAL CA: CPT | Performed by: STUDENT IN AN ORGANIZED HEALTH CARE EDUCATION/TRAINING PROGRAM

## 2024-04-25 PROCEDURE — 83880 ASSAY OF NATRIURETIC PEPTIDE: CPT | Mod: 91 | Performed by: STUDENT IN AN ORGANIZED HEALTH CARE EDUCATION/TRAINING PROGRAM

## 2024-04-25 PROCEDURE — 85018 HEMOGLOBIN: CPT | Mod: 91

## 2024-04-25 PROCEDURE — 85041 AUTOMATED RBC COUNT: CPT | Performed by: STUDENT IN AN ORGANIZED HEALTH CARE EDUCATION/TRAINING PROGRAM

## 2024-04-25 PROCEDURE — 250N000013 HC RX MED GY IP 250 OP 250 PS 637: Performed by: STUDENT IN AN ORGANIZED HEALTH CARE EDUCATION/TRAINING PROGRAM

## 2024-04-25 PROCEDURE — 80053 COMPREHEN METABOLIC PANEL: CPT | Performed by: STUDENT IN AN ORGANIZED HEALTH CARE EDUCATION/TRAINING PROGRAM

## 2024-04-25 PROCEDURE — 85610 PROTHROMBIN TIME: CPT | Performed by: STUDENT IN AN ORGANIZED HEALTH CARE EDUCATION/TRAINING PROGRAM

## 2024-04-25 PROCEDURE — 84238 ASSAY NONENDOCRINE RECEPTOR: CPT | Performed by: STUDENT IN AN ORGANIZED HEALTH CARE EDUCATION/TRAINING PROGRAM

## 2024-04-25 PROCEDURE — 82247 BILIRUBIN TOTAL: CPT | Performed by: STUDENT IN AN ORGANIZED HEALTH CARE EDUCATION/TRAINING PROGRAM

## 2024-04-25 PROCEDURE — 93451 RIGHT HEART CATH: CPT | Performed by: INTERNAL MEDICINE

## 2024-04-25 PROCEDURE — 83880 ASSAY OF NATRIURETIC PEPTIDE: CPT | Performed by: STUDENT IN AN ORGANIZED HEALTH CARE EDUCATION/TRAINING PROGRAM

## 2024-04-25 PROCEDURE — 80061 LIPID PANEL: CPT | Performed by: STUDENT IN AN ORGANIZED HEALTH CARE EDUCATION/TRAINING PROGRAM

## 2024-04-25 PROCEDURE — 83550 IRON BINDING TEST: CPT | Performed by: STUDENT IN AN ORGANIZED HEALTH CARE EDUCATION/TRAINING PROGRAM

## 2024-04-25 RX ORDER — METOLAZONE 2.5 MG/1
2.5 TABLET ORAL PRN
Qty: 10 TABLET | Refills: 3 | Status: SHIPPED | OUTPATIENT
Start: 2024-04-25

## 2024-04-25 RX ORDER — TORSEMIDE 20 MG/1
60 TABLET ORAL 2 TIMES DAILY
Qty: 180 TABLET | Refills: 3 | Status: SHIPPED | OUTPATIENT
Start: 2024-04-25 | End: 2024-04-30

## 2024-04-25 RX ORDER — DIPHENHYDRAMINE HCL 25 MG
25 CAPSULE ORAL EVERY 6 HOURS PRN
COMMUNITY

## 2024-04-25 RX ORDER — POTASSIUM CHLORIDE 1500 MG/1
TABLET, EXTENDED RELEASE ORAL
Qty: 186 TABLET | Refills: 3 | Status: SHIPPED | OUTPATIENT
Start: 2024-04-25 | End: 2024-04-30

## 2024-04-25 RX ORDER — LIDOCAINE 40 MG/G
CREAM TOPICAL
Status: COMPLETED | OUTPATIENT
Start: 2024-04-25 | End: 2024-04-25

## 2024-04-25 RX ORDER — POTASSIUM CHLORIDE 1.5 G/1.58G
40 POWDER, FOR SOLUTION ORAL ONCE
Status: COMPLETED | OUTPATIENT
Start: 2024-04-25 | End: 2024-04-25

## 2024-04-25 RX ADMIN — LIDOCAINE: 40 CREAM TOPICAL at 10:34

## 2024-04-25 RX ADMIN — POTASSIUM CHLORIDE 40 MEQ: 1.5 POWDER, FOR SOLUTION ORAL at 12:49

## 2024-04-25 ASSESSMENT — ACTIVITIES OF DAILY LIVING (ADL)
ADLS_ACUITY_SCORE: 35

## 2024-04-25 NOTE — PROGRESS NOTES
Called [facility] and confirmed they had orders for Andre-   Tanvi Flanagan RN on 4/26/2024 at 12:48 PM    --------------------------------------------------------------------------------------------------------------------  Called patient and her weight is 172 today. Advised her to take metolazone today as her weight is up from 169lb. She will call over the weekend if her weights trend up. She will complete labs on Monday. She has a follow-up on Thursday.     Called daughter Temitope to go over plan.     Tanvi Flanagan RN on 4/26/2024 at 9:24 AM      ----------------------------------------------------------------------------------------------------------  Orders entered per Dr. Powell.    Faxed labs to [facility] at [phone]. Talked with daughter who brought patient to ED in [facility] for blood transfusion. She will bring patient to lab on Monday. She will  prescriptions as ordered. Patient will call with weight gain concerns      Tanvi Flanagan RN on 4/25/2024 at 4:40 PM        .

## 2024-04-25 NOTE — TELEPHONE ENCOUNTER
Called patient after today's RHC showed that patient is still volume overloaded with elevated biventricular pressures. She was recently hospitalized with decompensated heart failure.    We will complete the following changes:    1. Her K was 3.1. Prescribe KCl 80 mEq once to be taken today. Then prescribe KCl 40 mEq once daily starting tomorrow.   2. Increase torsemide from 60 mg daily to 60 mg twice a day (her [facility] team did this yesterday).  3. If patient's weight continues to increase tomorrow, patient/patient's daughter will contact us and we will prescribe metolazone 2.5 mg once to be giving 30 minutes before torsemide dose.  4. Labs on Monday (BMP, Mg, NTproBNP, CBC) of next week at [facility].  5. One unit pRBC transfusion in [place] through [facility].  6. In person appointment with me 5/2 at 8:15 AM instead of virtual.        Anabel Powell MD, PhD   of Medicine  Center for Pulmonary Hypertension  Cardiovascular Division  [facility]

## 2024-04-25 NOTE — PROGRESS NOTES
Arrived from home for a right heart catheterization ans pulmonary vasodilation.  VSS.  Denies pain.  H&P current.  K+ 3.1, Hgb 7.1.  Will be ready when consent obtained.

## 2024-04-25 NOTE — TELEPHONE ENCOUNTER
[facility] Call Center    Phone Message    May a detailed message be left on voicemail: yes     Reason for Call: Per pt daughter clinic has not herd anything about blood transfusion, please reach out to further discuss.    Action Taken: Other: Cardiology    Travel Screening: Not Applicable    Thank you!  [facility]

## 2024-04-25 NOTE — DISCHARGE INSTRUCTIONS
[facility]                        Interventional Cardiology  Discharge Instructions   Post Right Heart Catheterization      AFTER YOU GO HOME:  DO drink plenty of fluids  DO resume your regular diet and medications unless otherwise instructed by your Primary Physician  Do Not scrub the procedure site vigorously  No lotion or powder to the puncture site for 3 days    CALL YOUR PRIMARY PHYSICIAN IF: You may resume all normal activity.  Monitor neck site for bleeding, swelling, or voice changes. If you notice bleeding or swelling immediately apply pressure to the site and call number below to speak with Cardiology Fellow.  If you experience any changes in your breathing you should call your doctor immediately or come to the closest Emergency Department.  Do not drive yourself.    ADDITIONAL INSTRUCTIONS: Medications: You are to resume all home medications including anticoagulation therapy unless otherwise advised by your primary cardiologist or nurse coordinator.    Follow Up: Per your primary cardiology team    If you have any questions or concerns regarding your procedure site please call [phone] at anytime and ask for Cardiology Fellow on call.  They are available 24 hours a day.  You may also contact the Cardiology Clinic after hours number at [phone].                                                       Telephone Numbers [phone] Monday-Friday 8:00 am to 4:30 pm    [phone] After 4:30 pm Monday-Friday, Weekends & Holidays  Ask for Interventional Cardiologist on call. Someone is on call 24 hours/day   [facility] toll free number [phone] Monday-Friday 8:00 am to 4:30 pm   [facility] Emergency Dept [phone]

## 2024-04-25 NOTE — Clinical Note
dry, intact, no bleeding and no hematoma. RIJ sheath removed, manual pressure held to hemostasis. Band-Aid

## 2024-04-27 LAB — STFR SERPL-MCNC: 5.2 MG/L

## 2024-04-30 ENCOUNTER — TELEPHONE (OUTPATIENT)
Dept: CARDIOLOGY | Facility: CLINIC | Age: 74
End: 2024-04-30
Payer: MEDICARE

## 2024-04-30 DIAGNOSIS — I27.20 PULMONARY HTN (H): ICD-10-CM

## 2024-04-30 DIAGNOSIS — D50.9 ANEMIA, IRON DEFICIENCY: Primary | ICD-10-CM

## 2024-04-30 RX ORDER — POTASSIUM CHLORIDE 1500 MG/1
60 TABLET, EXTENDED RELEASE ORAL DAILY
Qty: 240 TABLET | Refills: 3 | Status: SHIPPED | OUTPATIENT
Start: 2024-04-30 | End: 2024-05-02

## 2024-04-30 RX ORDER — DIPHENHYDRAMINE HYDROCHLORIDE 50 MG/ML
50 INJECTION INTRAMUSCULAR; INTRAVENOUS
Start: 2024-05-07

## 2024-04-30 RX ORDER — EPINEPHRINE 1 MG/ML
0.3 INJECTION, SOLUTION, CONCENTRATE INTRAVENOUS EVERY 5 MIN PRN
OUTPATIENT
Start: 2024-05-07

## 2024-04-30 RX ORDER — MEPERIDINE HYDROCHLORIDE 25 MG/ML
25 INJECTION INTRAMUSCULAR; INTRAVENOUS; SUBCUTANEOUS EVERY 30 MIN PRN
OUTPATIENT
Start: 2024-05-07

## 2024-04-30 RX ORDER — POTASSIUM CHLORIDE 1500 MG/1
60 TABLET, EXTENDED RELEASE ORAL DAILY
Qty: 240 TABLET | Refills: 3 | Status: SHIPPED | OUTPATIENT
Start: 2024-04-30 | End: 2024-04-30

## 2024-04-30 RX ORDER — ALBUTEROL SULFATE 0.83 MG/ML
2.5 SOLUTION RESPIRATORY (INHALATION)
OUTPATIENT
Start: 2024-05-07

## 2024-04-30 RX ORDER — HEPARIN SODIUM,PORCINE 10 UNIT/ML
5-20 VIAL (ML) INTRAVENOUS DAILY PRN
OUTPATIENT
Start: 2024-05-07

## 2024-04-30 RX ORDER — ALBUTEROL SULFATE 90 UG/1
1-2 AEROSOL, METERED RESPIRATORY (INHALATION)
Start: 2024-05-07

## 2024-04-30 RX ORDER — TORSEMIDE 20 MG/1
80 TABLET ORAL 2 TIMES DAILY
Qty: 180 TABLET | Refills: 3 | Status: SHIPPED | OUTPATIENT
Start: 2024-04-30 | End: 2024-05-02

## 2024-04-30 RX ORDER — METHYLPREDNISOLONE SODIUM SUCCINATE 125 MG/2ML
125 INJECTION, POWDER, LYOPHILIZED, FOR SOLUTION INTRAMUSCULAR; INTRAVENOUS
Start: 2024-05-07

## 2024-04-30 RX ORDER — HEPARIN SODIUM (PORCINE) LOCK FLUSH IV SOLN 100 UNIT/ML 100 UNIT/ML
5 SOLUTION INTRAVENOUS
OUTPATIENT
Start: 2024-05-07

## 2024-04-30 NOTE — TELEPHONE ENCOUNTER
Patient weight 4/26 was 172lb. She was advised to take metolazone 2.5mg. 4/25 potassium plan was 80meq 1x then take 40meq daily. She did receive 1unit PRBC locally on 4/25. HGB is 7.0.  She had repeat labs 4/29- sent to Dr. Powell for review. Her weight today is 171lb. Advised I would update her with plan per Dr. Powell and call her back today.       Tanvi Flanagan RN on 4/30/2024 at 10:56 AM

## 2024-04-30 NOTE — PROGRESS NOTES
Date: 4/30/2024    Time of Call: 12:05 PM     Diagnosis:  Anemia, hypokalemia     [ VORB ] Ordering provider: Dr. Powell  Order:   INCREASE potassium to 60meq daily  Order Iron infusions locally  Take 2.5mg Metolazone 1x today  INCREASE torsemide to 80mg BID     Order received by: Tanvi Flanagan RN       Follow-up/additional notes:     Faxed orders to [facility] [phone]  Called patient with updated plan.     Tanvi Flanagan RN on 4/30/2024 at 12:12 PM

## 2024-05-02 ENCOUNTER — LAB (OUTPATIENT)
Dept: LAB | Facility: CLINIC | Age: 74
End: 2024-05-02
Payer: MEDICARE

## 2024-05-02 ENCOUNTER — OFFICE VISIT (OUTPATIENT)
Dept: CARDIOLOGY | Facility: CLINIC | Age: 74
End: 2024-05-02
Attending: STUDENT IN AN ORGANIZED HEALTH CARE EDUCATION/TRAINING PROGRAM
Payer: MEDICARE

## 2024-05-02 VITALS
OXYGEN SATURATION: 99 % | HEART RATE: 67 BPM | BODY MASS INDEX: 29.39 KG/M2 | WEIGHT: 176.6 LBS | SYSTOLIC BLOOD PRESSURE: 88 MMHG | DIASTOLIC BLOOD PRESSURE: 52 MMHG

## 2024-05-02 DIAGNOSIS — R79.1 ABNORMAL COAGULATION PROFILE: ICD-10-CM

## 2024-05-02 DIAGNOSIS — K31.819 GAVE (GASTRIC ANTRAL VASCULAR ECTASIA): Primary | ICD-10-CM

## 2024-05-02 DIAGNOSIS — R06.09 DOE (DYSPNEA ON EXERTION): ICD-10-CM

## 2024-05-02 DIAGNOSIS — I27.20 PULMONARY HTN (H): ICD-10-CM

## 2024-05-02 DIAGNOSIS — Z11.4 ENCOUNTER FOR SCREENING FOR HUMAN IMMUNODEFICIENCY VIRUS (HIV): ICD-10-CM

## 2024-05-02 DIAGNOSIS — Z11.59 ENCOUNTER FOR SCREENING FOR OTHER VIRAL DISEASES: ICD-10-CM

## 2024-05-02 DIAGNOSIS — G47.33 OSA (OBSTRUCTIVE SLEEP APNEA): ICD-10-CM

## 2024-05-02 LAB
ERYTHROCYTE [DISTWIDTH] IN BLOOD BY AUTOMATED COUNT: 18.5 % (ref 10–15)
HCT VFR BLD AUTO: 21.5 % (ref 35–47)
HGB BLD-MCNC: 6.4 G/DL (ref 11.7–15.7)
INR PPP: 1.28 (ref 0.85–1.15)
MAGNESIUM SERPL-MCNC: 2.2 MG/DL (ref 1.7–2.3)
MCH RBC QN AUTO: 26.8 PG (ref 26.5–33)
MCHC RBC AUTO-ENTMCNC: 29.8 G/DL (ref 31.5–36.5)
MCV RBC AUTO: 90 FL (ref 78–100)
NT-PROBNP SERPL-MCNC: 6754 PG/ML (ref 0–900)
PLATELET # BLD AUTO: 215 10E3/UL (ref 150–450)
RBC # BLD AUTO: 2.39 10E6/UL (ref 3.8–5.2)
WBC # BLD AUTO: 6.9 10E3/UL (ref 4–11)

## 2024-05-02 PROCEDURE — 80048 BASIC METABOLIC PNL TOTAL CA: CPT | Performed by: PATHOLOGY

## 2024-05-02 PROCEDURE — 85390 FIBRINOLYSINS SCREEN I&R: CPT | Mod: 26 | Performed by: PATHOLOGY

## 2024-05-02 PROCEDURE — G0463 HOSPITAL OUTPT CLINIC VISIT: HCPCS | Performed by: STUDENT IN AN ORGANIZED HEALTH CARE EDUCATION/TRAINING PROGRAM

## 2024-05-02 PROCEDURE — 85027 COMPLETE CBC AUTOMATED: CPT | Performed by: PATHOLOGY

## 2024-05-02 PROCEDURE — 83529 ASAY OF INTERLEUKIN-6 (IL-6): CPT | Performed by: PATHOLOGY

## 2024-05-02 PROCEDURE — 84520 ASSAY OF UREA NITROGEN: CPT | Performed by: STUDENT IN AN ORGANIZED HEALTH CARE EDUCATION/TRAINING PROGRAM

## 2024-05-02 PROCEDURE — 80048 BASIC METABOLIC PNL TOTAL CA: CPT | Performed by: STUDENT IN AN ORGANIZED HEALTH CARE EDUCATION/TRAINING PROGRAM

## 2024-05-02 PROCEDURE — 86706 HEP B SURFACE ANTIBODY: CPT | Performed by: STUDENT IN AN ORGANIZED HEALTH CARE EDUCATION/TRAINING PROGRAM

## 2024-05-02 PROCEDURE — 86038 ANTINUCLEAR ANTIBODIES: CPT | Performed by: STUDENT IN AN ORGANIZED HEALTH CARE EDUCATION/TRAINING PROGRAM

## 2024-05-02 PROCEDURE — 86704 HEP B CORE ANTIBODY TOTAL: CPT | Performed by: STUDENT IN AN ORGANIZED HEALTH CARE EDUCATION/TRAINING PROGRAM

## 2024-05-02 PROCEDURE — 36415 COLL VENOUS BLD VENIPUNCTURE: CPT | Performed by: PATHOLOGY

## 2024-05-02 PROCEDURE — 99215 OFFICE O/P EST HI 40 MIN: CPT | Performed by: STUDENT IN AN ORGANIZED HEALTH CARE EDUCATION/TRAINING PROGRAM

## 2024-05-02 PROCEDURE — 99000 SPECIMEN HANDLING OFFICE-LAB: CPT | Performed by: PATHOLOGY

## 2024-05-02 PROCEDURE — 85613 RUSSELL VIPER VENOM DILUTED: CPT | Performed by: STUDENT IN AN ORGANIZED HEALTH CARE EDUCATION/TRAINING PROGRAM

## 2024-05-02 PROCEDURE — 83735 ASSAY OF MAGNESIUM: CPT | Performed by: PATHOLOGY

## 2024-05-02 PROCEDURE — 87389 HIV-1 AG W/HIV-1&-2 AB AG IA: CPT | Performed by: STUDENT IN AN ORGANIZED HEALTH CARE EDUCATION/TRAINING PROGRAM

## 2024-05-02 PROCEDURE — 83880 ASSAY OF NATRIURETIC PEPTIDE: CPT | Performed by: PATHOLOGY

## 2024-05-02 PROCEDURE — 85610 PROTHROMBIN TIME: CPT | Performed by: PATHOLOGY

## 2024-05-02 RX ORDER — POTASSIUM CHLORIDE 1500 MG/1
80 TABLET, EXTENDED RELEASE ORAL DAILY
Qty: 360 TABLET | Refills: 3 | Status: SHIPPED | OUTPATIENT
Start: 2024-05-02

## 2024-05-02 ASSESSMENT — PAIN SCALES - GENERAL: PAINLEVEL: NO PAIN (0)

## 2024-05-02 NOTE — LETTER
2024       RE: Evelyne Cutler  [address]  [address]  [facility] [address]     Dear Colleague,    Thank you for referring your patient, Evelyne Cutler, to the [facility] HEART [facility]INIC [place] at [facility]. Please see a copy of my visit note below.    Service Date: May 2, 2024    Dione Ruiz, APRN, CNP  [facility]  [address] [place] [address]        RE:      Evelyne Cutler   MRN:   [id number]  :   1950        Dear Ms. Ruiz:     I had the pleasure of seeing Evelyne Cutler at the [facility] Pulmonary [facility]pertension [facility]. As you know, Ms. Cutler is a very pleasant 73 year old female who presents for ongoing management of pulmonary hypertension. She has a history of:     Severe tricuspid regurgitation with moderately reduced RV function  Permanent atrial fibrillation status post Watchman implantation  Chronic hypoxemic respiratory failure on 1 L/min O2  GAVE with iron deficiency anemia  CKD stage 3  TIA  Dyslipidemia  Anxiety  Previous tobacco use     She established care with me on 24 for evaluation of pulmonary hypertension and severe TR. She was discharged for a recent hospitalization shortly before she saw me last in clinic and unfortunately was still significantly volume overloaded. She required repeat hospitalization from -24 for decompensated heart failure and anemia with Hgb 6.3, requiring blood transfusion    She presents today for follow-up, accompanied by her son, Jl and her daughter, Temitope (who is a pediatric nurse) was phone in.    She started having dyspnea 1.5 years ago that has progressively worsened with increasing lower extremity edema and abdominal distension. She can only ambulate short distances without dyspnea. She lives by herself in an apartment and has dyspnea when performing her ADLs. I would categorize her as WHO functional class IIIB. She has been hospitalized  now six times this year for decompensated heart failure. She was newly started on supplemental O2 1 L/min after a hospitalization in 4/2024. No syncope, presyncope, or chest pain. Continues to have significant lower extremity edema. Has melanic stools, no hematochezia.     She has been on multiple diuretic regimens. She is currently on torsemide 80 mg twice per day and due to weight gain over the past week, we advised her to take metolazone 2.5 mg twice over the last week.     She has previously developed hypotension with low dose sildenafil 20 mg three times a day. Was previously on midodrine. Had bradycardia with digoxin. Jardiance was stopped due to concern that it may have caused a rash.     Her prior evaluation has included a negative lung perfusion scan. Echocardiogram in 2/23/24 showed moderately enlarged RV, moderately reduced RV function, severe TR, and normal LVEF. She had a RHC in 2/2024 at [facility] that showed severe precapillary pulmonary hypertension with RAP 12, mPAP 45 mHg, PCWP 14, CO/CI 4.6/2.5, ad PVR 6.5. Flolan 0.008 ng/kg/min was reportedly given (dose lower than anticipated, uncertain whether this was a documentation error) for vasodilatory testing without response. Her evaluation was also notable for elevated NORRIS and centromere antibody.    We repeated a RHC on 4/25/24 that showed elevated biventricular pressures with RA 27, PA 88/30 (55), PCWP 27 (challenging to wedge), TD CO/CI 5.4/2.5, PVR 5.2. She was also found to be anemic and subsequently presented to the ED after the RHC for pRBC transfusion. We have also coordinated for her to receive IV iron locally.       PAST MEDICAL HISTORY:  Severe tricuspid regurgitation with moderately reduced RV function  Permanent atrial fibrillation status post Watchman implantation  GAVE with iron deficiency anemia  CKD stage 3  TIA  Dyslipidemia  Anxiety  Previous tobacco use       PAST SURGICAL HISTORY:  Past Surgical History:   Procedure Laterality  Date     CV RIGHT HEART CATH MEASUREMENTS RECORDED N/A 4/25/2024     LYMPH NODE BIOPSY       TUBAL LIGATION         FAMILY HISTORY:  Family History   Problem Relation Age of Onset     Pulmonary Hypertension Mother        SOCIAL HISTORY:  Social History     Socioeconomic History     Marital status: Legally      Spouse name: Not on file     Number of children: Not on file     Years of education: Not on file     Highest education level: Not on file   Occupational History     Not on file   Tobacco Use     Smoking status: Former     Current packs/day: 0.50     Average packs/day: 0.5 packs/day for 52.6 years (26.3 ttl pk-yrs)     Types: Cigarettes     Start date: 10/1971     Smokeless tobacco: Never   Substance and Sexual Activity     Alcohol use: Not Currently     Drug use: Not Currently     Sexual activity: Not on file   Other Topics Concern     Not on file   Social History Narrative    Retired, 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 Lives alone.     Social Determinants of Health     Financial Resource Strain: Low Risk  (2/11/2024)    Received from [facility], [facility]    Overall Financial Resource Strain (CARDIA)      Difficulty of Paying Living Expenses: Not very hard   Food Insecurity: No Food Insecurity (4/18/2024)    Received from [facility]    Hunger Vital Sign      Worried About Running Out of Food in the Last Year: Never true      Ran Out of Food in the Last Year: Never true   Transportation Needs: No Transportation Needs (4/18/2024)    Received from [facility]    Transportation Needs      In the past 12 months, has lack of transportation kept you from medical appointments, meetings, work, or from getting medicines or things needed for daily living?: No   Physical Activity: Insufficiently Active (2/11/2024)    Received from [facility], [facility]     Exercise Vital Sign      Days of Exercise per Week: 3 days      Minutes of Exercise per Session: 20 min   Stress: No Stress Concern Present (2/11/2024)    [facility], [facility]    [employer] [place] [employer] - Occupational Stress Questionnaire      Feeling of Stress : Only a little   Social Connections: Unknown (2/11/2024)    Received from [facility], [facility]    Social Connection and Isolation Panel [NHANES]      Frequency of Communication with Friends and Family: More than three times a week      Frequency of Social Gatherings with Friends and Family: More than three times a week      Attends Protestant Services: Patient declined      Active Member of Clubs or Organizations: No      Attends Club or Organization Meetings: 1 to 4 times per year      Marital Status:    Interpersonal Safety: Not At Risk (4/25/2024)    [facility]    Intimate Partner Violence      Are you in a relationship where you are physically hurt, threatened and/or made to feel afraid?: No   Housing Stability: Low Risk  (4/18/2024)    Received from [facility] [employer]    Housing Stability      In the last 12 months, was there a time when you were not able to pay the mortgage or rent on time?: No      In the last 12 months, how many places have you lived?: 1      Number of Places Lived in the Last Year (Outpatient): Not on file      In the last 12 months, how many places have you lived?: 0 to 2      In the last 12 months, was there a time when you did not have a steady place to sleep or slept in a shelter (including now)?: No       CURRENT MEDICATIONS:  Current Outpatient Medications   Medication Sig Dispense Refill     acetaminophen (TYLENOL) 325 MG tablet Take 325-650 mg by mouth       diphenhydrAMINE (BENADRYL) 25 MG capsule Take 25 mg by mouth every 6 hours as needed for  itching or allergies       ferrous gluconate (FERGON) 324 (38 Fe) MG tablet Take 324 mg by mouth every 48 hours       hydrocortisone (CORTIZONE 10) 1 % external lotion by topical route twice daily.       metolazone (ZAROXOLYN) 2.5 MG tablet Take 1 tablet (2.5 mg) by mouth as needed (as directed by cardiology) 10 tablet 3     pantoprazole (PROTONIX) 40 MG EC tablet Take 1 tablet by mouth daily before breakfast       potassium chloride alicia ER (KLOR-CON M20) 20 MEQ CR tablet Take 3 tablets (60 mEq) by mouth daily 240 tablet 3     rOPINIRole (REQUIP) 0.25 MG tablet Take 1 tablet by mouth at bedtime       sertraline (ZOLOFT) 50 MG tablet Take 1 tablet by mouth every morning       sodium chloride (OCEAN) 0.65 % nasal spray Spray 2 sprays in nostril       torsemide (DEMADEX) 20 MG tablet Take 4 tablets (80 mg) by mouth 2 times daily 180 tablet 3     vitamin C (ASCORBIC ACID) 500 MG tablet Take 500 mg by mouth every 48 hours       atorvastatin (LIPITOR) 20 MG tablet Take 1 tablet by mouth at bedtime       No current facility-administered medications for this visit.       EXAM:  BP (!) 88/52 (BP Location: Left arm, Patient Position: Sitting, Cuff Size: Adult Regular)   Pulse 67   Wt 80.1 kg (176 lb 9.6 oz)   SpO2 99%   BMI 29.39 kg/m    General: appears comfortable, alert and articulate  Head: normocephalic, atraumatic  Eyes: anicteric sclera, EOMI  Heart: irregularly irregular, normal S1, loud P2, 3/6 systolic ejection murmur in LLSB, estimated JVP 18 cm, 2+ bilateral radial pulses  Lungs: clear to auscultation bilaterally, no rales or wheezing, on 1 L/min O2 by nasal cannula  Abdomen: distended, bowel sounds present, no hepatosplenomegaly  Extremities: 2-3+ pitting bilateral lower extremity edema to the abdomen  Neurological: normal speech and affect, no gross motor deficits    Labs:  Recent Results (from the past 72 hour(s))   INR    Collection Time: 05/02/24 12:40 PM   Result Value Ref Range    INR 1.28 (H) 0.85 -  1.15   Anti Nuclear Ariela IgG by IFA with Reflex    Collection Time: 05/02/24 12:40 PM   Result Value Ref Range    NORRIS interpretation Positive (A) Negative    NORRIS pattern 1 Centromere     NORRIS titer 1 >1:1280    Lupus Anticoagulant Panel    Collection Time: 05/02/24 12:40 PM   Result Value Ref Range    Thrombin Time 18.0 13.0 - 19.0 Seconds    PTT Ratio 1.64 (H) <1.21    Platelet Neutralization 7 (H) <=0 Seconds    DRVVT Screen Ratio 0.97 <1.08    Lupus Result Positive (A) Negative    Lupus Interpretation       INR is elevated.  APTT ratio is elevated.  Platelet Neutralization is positive.  DRVVT Screen ratio is normal.  Thrombin time is normal.  POSITIVE TEST; THIS PATIENT HAS A  LUPUS ANTICOAGULANT.  Recommend repeat testing in 12 weeks to determine if the Lupus Anticoagulant is persistent or transient, since a transient one does not confer an increased thrombotic risk.  If the patient is on an anticoagulant, recommend repeat testing without anticoagulation interference to rule out a false positive lupus anticoagulant.  Patients with a lupus anticoagulant on warfarin therapy should be considered for monitoring with a factor 2 (factor II) level or chromogenic factor 10 (factor X) assay.  Clinical conditions resulting in a high C-reative protein (CRP) level may result in a positive Lupus Anticoagulant test.  Clinical correlation is recommended.    Aury Lugo MD, PhD  [facility]         Hepatitis B core antibody    Collection Time: 05/02/24 12:40 PM   Result Value Ref Range    Hepatitis B Core Antibody Total Nonreactive Nonreactive   Hepatitis B Surface Antibody    Collection Time: 05/02/24 12:40 PM   Result Value Ref Range    Hepatitis B Surface Antibody Nonreactive     Hepatitis B Surface Antibody Instrument Value <3.50 <8.5 m[IU]/mL   HIV Antigen Antibody Combo    Collection Time: 05/02/24 12:40 PM   Result Value Ref Range    HIV Antigen Antibody Combo Nonreactive Nonreactive   N terminal pro BNP  outpatient    Collection Time: 05/02/24 12:40 PM   Result Value Ref Range    N Terminal Pro BNP Outpatient 6,754 (H) 0 - 900 pg/mL   Magnesium    Collection Time: 05/02/24 12:40 PM   Result Value Ref Range    Magnesium 2.2 1.7 - 2.3 mg/dL   Basic metabolic panel    Collection Time: 05/02/24 12:40 PM   Result Value Ref Range    Sodium 140 135 - 145 mmol/L    Potassium 3.7 3.4 - 5.3 mmol/L    Chloride 99 98 - 107 mmol/L    Carbon Dioxide (CO2) 27 22 - 29 mmol/L    Anion Gap 14 7 - 15 mmol/L    Urea Nitrogen 40.7 (H) 8.0 - 23.0 mg/dL    Creatinine 1.57 (H) 0.51 - 0.95 mg/dL    GFR Estimate 35 (L) >60 mL/min/1.73m2    Calcium 8.5 (L) 8.8 - 10.2 mg/dL    Glucose 99 70 - 99 mg/dL   Basic metabolic panel    Collection Time: 05/02/24 12:40 PM   Result Value Ref Range    Sodium 139 135 - 145 mmol/L    Potassium 3.8 3.4 - 5.3 mmol/L    Chloride 99 98 - 107 mmol/L    Carbon Dioxide (CO2) 24 22 - 29 mmol/L    Anion Gap 16 (H) 7 - 15 mmol/L    Urea Nitrogen 40.1 (H) 8.0 - 23.0 mg/dL    Creatinine 1.60 (H) 0.51 - 0.95 mg/dL    GFR Estimate 34 (L) >60 mL/min/1.73m2    Calcium 8.4 (L) 8.8 - 10.2 mg/dL    Glucose 96 70 - 99 mg/dL   CBC with platelets    Collection Time: 05/02/24  3:11 PM   Result Value Ref Range    WBC Count 6.9 4.0 - 11.0 10e3/uL    RBC Count 2.39 (L) 3.80 - 5.20 10e6/uL    Hemoglobin 6.4 (LL) 11.7 - 15.7 g/dL    Hematocrit 21.5 (L) 35.0 - 47.0 %    MCV 90 78 - 100 fL    MCH 26.8 26.5 - 33.0 pg    MCHC 29.8 (L) 31.5 - 36.5 g/dL    RDW 18.5 (H) 10.0 - 15.0 %    Platelet Count 215 150 - 450 10e3/uL       Echocardiogram 2/23/24 at [facility] (personally reviewed):  Summary:    * The estimated ejection fraction is 65-70%.     * Left ventricular segmental wall motion is normal.     * The right ventricle is moderately enlarged.     * Moderately reduced right ventricular systolic function.     * There is severe tricuspid regurgitation.     * Severe pulmonary hypertension, estimated pulmonary arterial systolic pressure is   102 mmHg.     * Prior study from 1/10/2024.  [id number]-de-[id number] comparison EF still 65%, RV is moderately enlarged with moderately reduced function PA pressures still severely elevated at 100 prior study PA pressures were 90 with dilated RV   with decreased RV function.      High resolution CT chest 2/22/24 at [facility] (personally reviewed):  1. Findings of congestive heart failure   2. Negative for pulmonary fibrosis.  The minor atelectasis and bronchiectasis   doubtful clinical relevance   3. Micronodules do not require follow-up unless the patient high risk than 12   month but these are of doubtful clinical relevance   4. The body wall edema, pleural effusions and ascites reflecting the 3rd spacing and CHF   5. Pulmonary artery hypertension, cardiomegaly and left ventricle exclusion   device   6. Hypoattenuation of liver consistent with steatosis. This associates with metabolic syndrome, hyperinsulinemia, predisposing to steatohepatitis and sequelae including cirrhosis and HCC.      NM lung perfusion scan at [facility] 2/22/24 (personally reviewed):  1. Abnormal clumped radiotracer on ventilation scan which may be secondary to COPD.   2. Multiple matched segmental the fax within the bilateral lungs.  However   distribution favors artifact secondary to retained radiotracer from ventilation   scan rather than true perfusion defects.      Overnight oximetry 2/21/24 at [facility]:  Overnight oximetry was performed at the request of Edmar Flaherty MD, on Evelyne Cutler from February 21, 2024 to February 22, 2024.  The patient was monitored for 9 hours and 8 minutes.  The patient's saturations were below 90% for 15.9% of the tested time.  Supplemental oxygen was added.  Some cyclic desaturations were noted, potentially suggestive of a sleep-related breathing disorder.  The patient ended up on 1 L/minutes nasal cannula.      RHC 4/25/24 at [facility]:  RA: 27  RV: 90/20  PA: 88/30 (55)  PCWP: 27 (challenge to obtain PCWP despite  multiple attempts, likely partial wedge)  TD CO/CI: 5.4/2.5  Oli CO/CI: 4.5/2.5  PVR: 5.2    RHC 2/26/24 at [facility]:  RA: 12  RV: 75/12  PA: 75/30 (45)  PCWP: 15  Oli CO/CI: 4.6/2.5  PVR: 6.5     With Flolan 0.008 ng/kg/min (uncertain whether this is correct):  PA: 70/25 (40)  PCWP: 15  CO/CI: 4.9/2.7  PVR: 5.1      Assessment and Plan:     Evelyne Cutler is a very pleasant 73 year old female with a history of severe TR with moderately reduced RV function, permanent atrial fibrillation status post Watchman implantation, GAVE with iron deficiency anemia, and CKD stage 3 who presents for evaluation and management of pulmonary hypertension.     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 Echocardiogram in 2/23/24 showed moderately enlarged RV, moderately reduced RV function, severe TR, and normal LVEF. She had a RHC in 2/2024 at [facility] that showed severe precapillary pulmonary hypertension with RAP 12, mPAP 45 mHg, PCWP 14, CO/CI 4.6/2.5, and PVR 6.5. Flolan 0.008 ng/kg/min was reportedly given (dose lower than anticipated, uncertain whether this was a documentation error) for vasodilatory testing without response. Her evaluation was also notable for elevated NORRIS and centromere antibody.     We repeated a RHC on 4/25/24 that showed elevated biventricular pressures with RA 27, PA 88/30 (55), PCWP 27 (challenging to wedge), TD CO/CI 5.4/2.5, PVR 5.2.     I would like to repeat a RHC to re-evaluate her PH after she is diuresed as she has risk factors for precapillary PH/PAH due to her elevated NORRIS.    She is significantly volume overloaded. Our team is now managing her diuretics until she is closed to euvolemic. She is currently on torsemide 80 mg twice a day. Will increase to torsemide 80 mg qAM, torsemide 80 mg mid-day, and torsemide 40 mg in the evening and recheck labs early next week. Will also increase her KCl from 60 to  100 mEq daily. Anticipate that she will need further increase dose of diuretics but cautiously increasing due to hypokalemia.      To complete the PH evaluation, we will need to complete PFTs, sleep study, and 6MWT. PFTs and sleep study can be completed nonurgently and locally. She has an appointment with Rheumatology at [facility] on 5/24/24 as she has a positive NORRIS and centromere antibody. Will also refer her to pulmonary rehab in the future.     We had a discussion about the challenges in management of severe TR which contributes to worsening RV function, which then begets more TR. She previously had difficulties with low dose vasodilators. She needs aggressive diuresis to offload the weak RV. Will repeat RHC when closer to euvolemia.    2. Iron deficiency anemia, history of GAVE. Unfortunately she has recurrent anemia requiring frequent pRBCs. Due to frequency of transfusions and patient preference to receive the transfusions locally through [facility], discussed with patient's PCP (Dr. Rankin) and [facility] Cardiology team (Dione Ruiz) and they will monitor CBC locally and coordinate for blood transfusions. Greatly appreciate their assistance.    We have coordinated for her to receive IV iron locally. Recommend consider giving an IV diuretic dose after pRBC transfusion to prevent further volume overload (this is directly communicated with Dr. Rankin).    Also recommended that patient see GI again through [facility] to determine whether to pursue repeat EGD with intervention for GAVE if possible. She last had an EGD in 2/2024. Appreciate Dr. Rankin and Dione Ruiz's assistance with putting in the [facility] GI referral.    3. Permanent atrial fibrillation status post Watchman due to GAVE. Has ongoing bleeding.    Our team will call her weekly to get weights and patient knows to contact us earlier if she gains weight. We will closely monitor her electrolytes. Follow-up in person with me with labs in 1  month.    It was a pleasure seeing Evelyne Cutler at the [facility] Pulmonary Hypertension Clinic. Please contact me with any questions or concerns that you may have.      The longitudinal plan of care for pulmonary hypertension was addressed during this visit. Due to the added complexity in care, I will continue to support Evelyne Cutler in the subsequent management of this condition(s) and with the ongoing continuity of care of this condition(s).    I spent a total of 60 minutes on the date of service evaluating this patient which included face to face discussion, performing a physical exam, reviewing of the chart to gain information from other providers to obtain further history, personally reviewing prior lab and imaging results, ordering tests and/or medications, and documenting clinical information in the electronic health record.         Sincerely,      Anabel Powell MD, PhD   of Medicine  Center for Pulmonary Hypertension  Cardiovascular Division  [facility]          Again, thank you for allowing me to participate in the care of your patient.      Sincerely,    Anabel Powell MD     Otezla Counseling: The side effects of Otezla were discussed with the patient, including but not limited to worsening or new depression, weight loss, diarrhea, nausea, upper respiratory tract infection, and headache. Patient instructed to call the office should any adverse effect occur.  The patient verbalized understanding of the proper use and possible adverse effects of Otezla.  All the patient's questions and concerns were addressed.

## 2024-05-02 NOTE — PATIENT INSTRUCTIONS
You were seen today in the Pulmonary Hypertension Clinic at the [facility].     Cardiology Provider you saw during your visit:    Dr. Powell    Medication Changes:  INCREASE torsemide to 80mg AM, 80mg Noon, 40mg PM  INCREASE potassium to 100meq daily    Recommendations:   Labs today again  Labs on Tuesday- will fax orders  Schedule with Gastroenterology regarding your GAVE and managing your hemoglobin      Follow-up:  1MO follow-up with Dr. Powell       Please call us immediately if you have any syncope (fainting or passing out), chest pain, edema (swelling or weight gain), or decline in your functional status (general decline in how you are feeling).    If you have emergent concerns after hours or on the weekend, please call our on-call Cardiologist at [phone], option [phone]. For emergencies call [phone].     Thank you for allowing us to be a part of your care here at the [facility]    Please visit the pulmonary hypertension website for more information and support. [email or web address]    If you have questions or concerns please contact us at:    Tanvi Flanagan RN (P: [phone])    Nurse Coordinator       Pulmonary Hypertension     [facility]         SUZAN Betancourt   (Prior Authorizations)    ()  Clinic   Clinic   Pulmonary Hypertension   Pulmonary Hypertension  [facility]  [facility]  (P)[phone]    (P) [phone]  (F) [phone]

## 2024-05-02 NOTE — PROGRESS NOTES
Service Date: May 2, 2024    Dione Ruiz, YODIT, CNP  [facility]  [address] [place] [address]        RE:      Evelyne Cutler   MRN:   [id number]  :   1950        Dear Ms. Ruiz:     I had the pleasure of seeing Evelyne Cutler at the [facility] Pulmonary Hypertension [facility]. As you know, Ms. Cutler is a very pleasant 73 year old female who presents for ongoing management of pulmonary hypertension. She has a history of:     Severe tricuspid regurgitation with moderately reduced RV function  Permanent atrial fibrillation status post Watchman implantation  Chronic hypoxemic respiratory failure on 1 L/min O2  GAVE with iron deficiency anemia  CKD stage 3  TIA  Dyslipidemia  Anxiety  Previous tobacco use     She established care with me on 24 for evaluation of pulmonary hypertension and severe TR. She was discharged for a recent hospitalization shortly before she saw me last in clinic and unfortunately was still significantly volume overloaded. She required repeat hospitalization from -24 for decompensated heart failure and anemia with Hgb 6.3, requiring blood transfusion    She presents today for follow-up, accompanied by her son, Jl and her daughter, Temitope (who is a pediatric nurse) was phone in.    She started having dyspnea 1.5 years ago that has progressively worsened with increasing lower extremity edema and abdominal distension. She can only ambulate short distances without dyspnea. She lives by herself in an apartment and has dyspnea when performing her ADLs. I would categorize her as WHO functional class IIIB. She has been hospitalized now six times this year for decompensated heart failure. She was newly started on supplemental O2 1 L/min after a hospitalization in 2024. No syncope, presyncope, or chest pain. Continues to have significant lower extremity edema. Has melanic stools, no hematochezia.     She has been on multiple diuretic  regimens. She is currently on torsemide 80 mg twice per day and due to weight gain over the past week, we advised her to take metolazone 2.5 mg twice over the last week.     She has previously developed hypotension with low dose sildenafil 20 mg three times a day. Was previously on midodrine. Had bradycardia with digoxin. Jardiance was stopped due to concern that it may have caused a rash.     Her prior evaluation has included a negative lung perfusion scan. Echocardiogram in 2/23/24 showed moderately enlarged RV, moderately reduced RV function, severe TR, and normal LVEF. She had a RHC in 2/2024 at [facility] that showed severe precapillary pulmonary hypertension with RAP 12, mPAP 45 mHg, PCWP 14, CO/CI 4.6/2.5, ad PVR 6.5. Flolan 0.008 ng/kg/min was reportedly given (dose lower than anticipated, uncertain whether this was a documentation error) for vasodilatory testing without response. Her evaluation was also notable for elevated NORRIS and centromere antibody.    We repeated a RHC on 4/25/24 that showed elevated biventricular pressures with RA 27, PA 88/30 (55), PCWP 27 (challenging to wedge), TD CO/CI 5.4/2.5, PVR 5.2. She was also found to be anemic and subsequently presented to the ED after the RHC for pRBC transfusion. We have also coordinated for her to receive IV iron locally.       PAST MEDICAL HISTORY:  Severe tricuspid regurgitation with moderately reduced RV function  Permanent atrial fibrillation status post Watchman implantation  GAVE with iron deficiency anemia  CKD stage 3  TIA  Dyslipidemia  Anxiety  Previous tobacco use       PAST SURGICAL HISTORY:  Past Surgical History:   Procedure Laterality Date    CV RIGHT HEART CATH MEASUREMENTS RECORDED N/A 4/25/2024    LYMPH NODE BIOPSY      TUBAL LIGATION         FAMILY HISTORY:  Family History   Problem Relation Age of Onset    Pulmonary Hypertension Mother        SOCIAL HISTORY:  Social History     Socioeconomic History    Marital status: Legally       Spouse name: Not on file    Number of children: Not on file    Years of education: Not on file    Highest education level: Not on file   Occupational History    Not on file   Tobacco Use    Smoking status: Former     Current packs/day: 0.50     Average packs/day: 0.5 packs/day for 52.6 years (26.3 ttl pk-yrs)     Types: Cigarettes     Start date: 10/1971    Smokeless tobacco: Never   Substance and Sexual Activity    Alcohol use: Not Currently    Drug use: Not Currently    Sexual activity: Not on file   Other Topics Concern    Not on file   Social History Narrative    Retired, 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 Lives alone.     Social Determinants of Health     Financial Resource Strain: Low Risk  (2/11/2024)    [facility]    Overall Financial Resource Strain (CARDIA)     Difficulty of Paying Living Expenses: Not very hard   Food Insecurity: No Food Insecurity (4/18/2024)    Received from [facility]    Hunger Vital Sign     Worried About Running Out of Food in the Last Year: Never true     Ran Out of Food in the Last Year: Never true   Transportation Needs: No Transportation Needs (4/18/2024)    Received from [facility]    Transportation Needs     In the past 12 months, has lack of transportation kept you from medical appointments, meetings, work, or from getting medicines or things needed for daily living?: No   Physical Activity: Insufficiently Active (2/11/2024)    Received from [facility], [facility]    Exercise Vital Sign     Days of Exercise per Week: 3 days     Minutes of Exercise per Session: 20 min   Stress: No Stress Concern Present (2/11/2024)    Received from [facility], [facility]    [employer] [place] [employer] - Occupational Stress Questionnaire      Feeling of Stress : Only a little   Social Connections: Unknown (2/11/2024)    Received from [facility], [facility]    Social Connection and Isolation Panel [NHANES]     Frequency of Communication with Friends and Family: More than three times a week     Frequency of Social Gatherings with Friends and Family: More than three times a week     Attends Jain Services: Patient declined     Active Member of Clubs or Organizations: No     Attends Club or Organization Meetings: 1 to 4 times per year     Marital Status:    Interpersonal Safety: Not At Risk (4/25/2024)    Received from [facility]    Intimate Partner Violence     Are you in a relationship where you are physically hurt, threatened and/or made to feel afraid?: No   Housing Stability: Low Risk  (4/18/2024)    Received from [facility]    Housing Stability     In the last 12 months, was there a time when you were not able to pay the mortgage or rent on time?: No     In the last 12 months, how many places have you lived?: 1     Number of Places Lived in the Last Year (Outpatient): Not on file     In the last 12 months, how many places have you lived?: 0 to 2     In the last 12 months, was there a time when you did not have a steady place to sleep or slept in a shelter (including now)?: No       CURRENT MEDICATIONS:  Current Outpatient Medications   Medication Sig Dispense Refill    acetaminophen (TYLENOL) 325 MG tablet Take 325-650 mg by mouth      diphenhydrAMINE (BENADRYL) 25 MG capsule Take 25 mg by mouth every 6 hours as needed for itching or allergies      ferrous gluconate (FERGON) 324 (38 Fe) MG tablet Take 324 mg by mouth every 48 hours      hydrocortisone (CORTIZONE 10) 1 % external lotion by topical route twice daily.      metolazone (ZAROXOLYN) 2.5 MG tablet Take 1 tablet (2.5 mg) by mouth as needed (as directed by cardiology) 10 tablet 3    pantoprazole  (PROTONIX) 40 MG EC tablet Take 1 tablet by mouth daily before breakfast      potassium chloride alicia ER (KLOR-CON M20) 20 MEQ CR tablet Take 3 tablets (60 mEq) by mouth daily 240 tablet 3    rOPINIRole (REQUIP) 0.25 MG tablet Take 1 tablet by mouth at bedtime      sertraline (ZOLOFT) 50 MG tablet Take 1 tablet by mouth every morning      sodium chloride (OCEAN) 0.65 % nasal spray Spray 2 sprays in nostril      torsemide (DEMADEX) 20 MG tablet Take 4 tablets (80 mg) by mouth 2 times daily 180 tablet 3    vitamin C (ASCORBIC ACID) 500 MG tablet Take 500 mg by mouth every 48 hours      atorvastatin (LIPITOR) 20 MG tablet Take 1 tablet by mouth at bedtime       No current facility-administered medications for this visit.       EXAM:  BP (!) 88/52 (BP Location: Left arm, Patient Position: Sitting, Cuff Size: Adult Regular)   Pulse 67   Wt 80.1 kg (176 lb 9.6 oz)   SpO2 99%   BMI 29.39 kg/m    General: appears comfortable, alert and articulate  Head: normocephalic, atraumatic  Eyes: anicteric sclera, EOMI  Heart: irregularly irregular, normal S1, loud P2, 3/6 systolic ejection murmur in LLSB, estimated JVP 18 cm, 2+ bilateral radial pulses  Lungs: clear to auscultation bilaterally, no rales or wheezing, on 1 L/min O2 by nasal cannula  Abdomen: distended, bowel sounds present, no hepatosplenomegaly  Extremities: 2-3+ pitting bilateral lower extremity edema to the abdomen  Neurological: normal speech and affect, no gross motor deficits    Labs:  Recent Results (from the past 72 hour(s))   INR    Collection Time: 05/02/24 12:40 PM   Result Value Ref Range    INR 1.28 (H) 0.85 - 1.15   Anti Nuclear Ariela IgG by IFA with Reflex    Collection Time: 05/02/24 12:40 PM   Result Value Ref Range    NORRIS interpretation Positive (A) Negative    NORRIS pattern 1 Centromere     NORRIS titer 1 >1:1280    Lupus Anticoagulant Panel    Collection Time: 05/02/24 12:40 PM   Result Value Ref Range    Thrombin Time 18.0 13.0 - 19.0 Seconds    PTT  Ratio 1.64 (H) <1.21    Platelet Neutralization 7 (H) <=0 Seconds    DRVVT Screen Ratio 0.97 <1.08    Lupus Result Positive (A) Negative    Lupus Interpretation       INR is elevated.  APTT ratio is elevated.  Platelet Neutralization is positive.  DRVVT Screen ratio is normal.  Thrombin time is normal.  POSITIVE TEST; THIS PATIENT HAS A  LUPUS ANTICOAGULANT.  Recommend repeat testing in 12 weeks to determine if the Lupus Anticoagulant is persistent or transient, since a transient one does not confer an increased thrombotic risk.  If the patient is on an anticoagulant, recommend repeat testing without anticoagulation interference to rule out a false positive lupus anticoagulant.  Patients with a lupus anticoagulant on warfarin therapy should be considered for monitoring with a factor 2 (factor II) level or chromogenic factor 10 (factor X) assay.  Clinical conditions resulting in a high C-reative protein (CRP) level may result in a positive Lupus Anticoagulant test.  Clinical correlation is recommended.    Aury Lugo MD, PhD  [facility]         Hepatitis B core antibody    Collection Time: 05/02/24 12:40 PM   Result Value Ref Range    Hepatitis B Core Antibody Total Nonreactive Nonreactive   Hepatitis B Surface Antibody    Collection Time: 05/02/24 12:40 PM   Result Value Ref Range    Hepatitis B Surface Antibody Nonreactive     Hepatitis B Surface Antibody Instrument Value <3.50 <8.5 m[IU]/mL   HIV Antigen Antibody Combo    Collection Time: 05/02/24 12:40 PM   Result Value Ref Range    HIV Antigen Antibody Combo Nonreactive Nonreactive   N terminal pro BNP outpatient    Collection Time: 05/02/24 12:40 PM   Result Value Ref Range    N Terminal Pro BNP Outpatient 6,754 (H) 0 - 900 pg/mL   Magnesium    Collection Time: 05/02/24 12:40 PM   Result Value Ref Range    Magnesium 2.2 1.7 - 2.3 mg/dL   Basic metabolic panel    Collection Time: 05/02/24 12:40 PM   Result Value Ref Range    Sodium 140 135 - 145  mmol/L    Potassium 3.7 3.4 - 5.3 mmol/L    Chloride 99 98 - 107 mmol/L    Carbon Dioxide (CO2) 27 22 - 29 mmol/L    Anion Gap 14 7 - 15 mmol/L    Urea Nitrogen 40.7 (H) 8.0 - 23.0 mg/dL    Creatinine 1.57 (H) 0.51 - 0.95 mg/dL    GFR Estimate 35 (L) >60 mL/min/1.73m2    Calcium 8.5 (L) 8.8 - 10.2 mg/dL    Glucose 99 70 - 99 mg/dL   Basic metabolic panel    Collection Time: 05/02/24 12:40 PM   Result Value Ref Range    Sodium 139 135 - 145 mmol/L    Potassium 3.8 3.4 - 5.3 mmol/L    Chloride 99 98 - 107 mmol/L    Carbon Dioxide (CO2) 24 22 - 29 mmol/L    Anion Gap 16 (H) 7 - 15 mmol/L    Urea Nitrogen 40.1 (H) 8.0 - 23.0 mg/dL    Creatinine 1.60 (H) 0.51 - 0.95 mg/dL    GFR Estimate 34 (L) >60 mL/min/1.73m2    Calcium 8.4 (L) 8.8 - 10.2 mg/dL    Glucose 96 70 - 99 mg/dL   CBC with platelets    Collection Time: 05/02/24  3:11 PM   Result Value Ref Range    WBC Count 6.9 4.0 - 11.0 10e3/uL    RBC Count 2.39 (L) 3.80 - 5.20 10e6/uL    Hemoglobin 6.4 (LL) 11.7 - 15.7 g/dL    Hematocrit 21.5 (L) 35.0 - 47.0 %    MCV 90 78 - 100 fL    MCH 26.8 26.5 - 33.0 pg    MCHC 29.8 (L) 31.5 - 36.5 g/dL    RDW 18.5 (H) 10.0 - 15.0 %    Platelet Count 215 150 - 450 10e3/uL       Echocardiogram 2/23/24 at [facility] (personally reviewed):  Summary:    * The estimated ejection fraction is 65-70%.     * Left ventricular segmental wall motion is normal.     * The right ventricle is moderately enlarged.     * Moderately reduced right ventricular systolic function.     * There is severe tricuspid regurgitation.     * Severe pulmonary hypertension, estimated pulmonary arterial systolic pressure is  102 mmHg.     * Prior study from 1/10/2024.  [id number] comparison EF still 65%, RV is moderately enlarged with moderately reduced function PA pressures still severely elevated at 100 prior study PA pressures were 90 with dilated RV   with decreased RV function.      High resolution CT chest 2/22/24 at [facility] (personally reviewed):  1.  Findings of congestive heart failure   2. Negative for pulmonary fibrosis.  The minor atelectasis and bronchiectasis   doubtful clinical relevance   3. Micronodules do not require follow-up unless the patient high risk than 12   month but these are of doubtful clinical relevance   4. The body wall edema, pleural effusions and ascites reflecting the 3rd spacing and CHF   5. Pulmonary artery hypertension, cardiomegaly and left ventricle exclusion   device   6. Hypoattenuation of liver consistent with steatosis. This associates with metabolic syndrome, hyperinsulinemia, predisposing to steatohepatitis and sequelae including cirrhosis and HCC.      NM lung perfusion scan at [facility] 2/22/24 (personally reviewed):  1. Abnormal clumped radiotracer on ventilation scan which may be secondary to COPD.   2. Multiple matched segmental the fax within the bilateral lungs.  However   distribution favors artifact secondary to retained radiotracer from ventilation   scan rather than true perfusion defects.      Overnight oximetry 2/21/24 at [facility]:  Overnight oximetry was performed at the request of Edmar Flaherty MD, on Evelyne Cutler from February 21, 2024 to February 22, 2024.  The patient was monitored for 9 hours and 8 minutes.  The patient's saturations were below 90% for 15.9% of the tested time.  Supplemental oxygen was added.  Some cyclic desaturations were noted, potentially suggestive of a sleep-related breathing disorder.  The patient ended up on 1 L/minutes nasal cannula.      RHC 4/25/24 at [facility]:  RA: 27  RV: 90/20  PA: 88/30 (55)  PCWP: 27 (challenge to obtain PCWP despite multiple attempts, likely partial wedge)  TD CO/CI: 5.4/2.5  Oli CO/CI: 4.5/2.5  PVR: 5.2    RHC 2/26/24 at [facility]:  RA: 12  RV: 75/12  PA: 75/30 (45)  PCWP: 15  Oli CO/CI: 4.6/2.5  PVR: 6.5     With Flolan 0.008 ng/kg/min (uncertain whether this is correct):  PA: 70/25 (40)  PCWP: 15  CO/CI: 4.9/2.7  PVR: 5.1      Assessment and Plan:      Evelyne Cutler is a very pleasant 73 year old female with a history of severe TR with moderately reduced RV function, permanent atrial fibrillation status post Watchman implantation, GAVE with iron deficiency anemia, and CKD stage 3 who presents for evaluation and management of pulmonary hypertension.     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 Echocardiogram in 2/23/24 showed moderately enlarged RV, moderately reduced RV function, severe TR, and normal LVEF. She had a RHC in 2/2024 at [facility] that showed severe precapillary pulmonary hypertension with RAP 12, mPAP 45 mHg, PCWP 14, CO/CI 4.6/2.5, and PVR 6.5. Flolan 0.008 ng/kg/min was reportedly given (dose lower than anticipated, uncertain whether this was a documentation error) for vasodilatory testing without response. Her evaluation was also notable for elevated NORRIS and centromere antibody.     We repeated a RHC on 4/25/24 that showed elevated biventricular pressures with RA 27, PA 88/30 (55), PCWP 27 (challenging to wedge), TD CO/CI 5.4/2.5, PVR 5.2.     I would like to repeat a RHC to re-evaluate her PH after she is diuresed as she has risk factors for precapillary PH/PAH due to her elevated NORRIS.    She is significantly volume overloaded. Our team is now managing her diuretics until she is closed to euvolemic. She is currently on torsemide 80 mg twice a day. Will increase to torsemide 80 mg qAM, torsemide 80 mg mid-day, and torsemide 40 mg in the evening and recheck labs early next week. Will also increase her KCl from 60 to 100 mEq daily. Anticipate that she will need further increase dose of diuretics but cautiously increasing due to hypokalemia.      To complete the PH evaluation, we will need to complete PFTs, sleep study, and 6MWT. PFTs and sleep study can be completed nonurgently and locally. She has an appointment with Rheumatology at [facility] on 5/24/24  as she has a positive NORRIS and centromere antibody. Will also refer her to pulmonary rehab in the future.     We had a discussion about the challenges in management of severe TR which contributes to worsening RV function, which then begets more TR. She previously had difficulties with low dose vasodilators. She needs aggressive diuresis to offload the weak RV. Will repeat RHC when closer to euvolemia.    2. Iron deficiency anemia, history of GAVE. Unfortunately she has recurrent anemia requiring frequent pRBCs. Due to frequency of transfusions and patient preference to receive the transfusions locally through [facility], discussed with patient's PCP (Dr. Rankin) and [facility] Cardiology team (Dione Ruiz) and they will monitor CBC locally and coordinate for blood transfusions. Greatly appreciate their assistance.    We have coordinated for her to receive IV iron locally. Recommend consider giving an IV diuretic dose after pRBC transfusion to prevent further volume overload (this is directly communicated with Dr. Rankin).    Also recommended that patient see GI again through [facility] to determine whether to pursue repeat EGD with intervention for GAVE if possible. She last had an EGD in 2/2024. Appreciate Dr. Rankin and Dione Ruiz's assistance with putting in the [facility] GI referral.    3. Permanent atrial fibrillation status post Watchman due to GAVE. Has ongoing bleeding.    Our team will call her weekly to get weights and patient knows to contact us earlier if she gains weight. We will closely monitor her electrolytes. Follow-up in person with me with labs in 1 month.    It was a pleasure seeing Evelyne Cutler at the [facility]. Please contact me with any questions or concerns that you may have.      The longitudinal plan of care for pulmonary hypertension was addressed during this visit. Due to the added complexity in care, I will continue to support Evelyne Cutler  in the subsequent management of this condition(s) and with the ongoing continuity of care of this condition(s).    I spent a total of 60 minutes on the date of service evaluating this patient which included face to face discussion, performing a physical exam, reviewing of the chart to gain information from other providers to obtain further history, personally reviewing prior lab and imaging results, ordering tests and/or medications, and documenting clinical information in the electronic health record.         Sincerely,      Anabel Powell MD, PhD   of Medicine  [facility]  Cardiovascular Division  [facility]

## 2024-05-02 NOTE — LETTER
2024      RE: Evelyne Cutler  [address]  [address]  [facility] [address]       Dear Colleague,    Thank you for the opportunity to participate in the care of your patient, Evelyne Cutler, at the [facility] [place] at [facility]. Please see a copy of my visit note below.    Service Date: May 2, 2024    Dione Ruiz, APRN, CNP  [facility]  [address] [place] [address]        RE:      Evelyne Cutler   MRN:   [id number]  :   1950        Dear Ms. Ruiz:     I had the pleasure of seeing Evelyne Cutler at the [facility]. As you know, Ms. Cutler is a very pleasant 73 year old female who presents for ongoing management of pulmonary hypertension. She has a history of:     Severe tricuspid regurgitation with moderately reduced RV function  Permanent atrial fibrillation status post Watchman implantation  Chronic hypoxemic respiratory failure on 1 L/min O2  GAVE with iron deficiency anemia  CKD stage 3  TIA  Dyslipidemia  Anxiety  Previous tobacco use     She established care with me on 24 for evaluation of pulmonary hypertension and severe TR. She was discharged for a recent hospitalization shortly before she saw me last in clinic and unfortunately was still significantly volume overloaded. She required repeat hospitalization from -24 for decompensated heart failure and anemia with Hgb 6.3, requiring blood transfusion    She presents today for follow-up, accompanied by her son, Jl and her daughter, Temitope (who is a pediatric nurse) was phone in.    She started having dyspnea 1.5 years ago that has progressively worsened with increasing lower extremity edema and abdominal distension. She can only ambulate short distances without dyspnea. She lives by herself in an apartment and has dyspnea when performing her ADLs. I would categorize her as WHO functional  class IIIB. She has been hospitalized now six times this year for decompensated heart failure. She was newly started on supplemental O2 1 L/min after a hospitalization in 4/2024. No syncope, presyncope, or chest pain. Continues to have significant lower extremity edema. Has melanic stools, no hematochezia.     She has been on multiple diuretic regimens. She is currently on torsemide 80 mg twice per day and due to weight gain over the past week, we advised her to take metolazone 2.5 mg twice over the last week.     She has previously developed hypotension with low dose sildenafil 20 mg three times a day. Was previously on midodrine. Had bradycardia with digoxin. Jardiance was stopped due to concern that it may have caused a rash.     Her prior evaluation has included a negative lung perfusion scan. Echocardiogram in 2/23/24 showed moderately enlarged RV, moderately reduced RV function, severe TR, and normal LVEF. She had a RHC in 2/2024 at [facility] that showed severe precapillary pulmonary hypertension with RAP 12, mPAP 45 mHg, PCWP 14, CO/CI 4.6/2.5, ad PVR 6.5. Flolan 0.008 ng/kg/min was reportedly given (dose lower than anticipated, uncertain whether this was a documentation error) for vasodilatory testing without response. Her evaluation was also notable for elevated NORRIS and centromere antibody.    We repeated a RHC on 4/25/24 that showed elevated biventricular pressures with RA 27, PA 88/30 (55), PCWP 27 (challenging to wedge), TD CO/CI 5.4/2.5, PVR 5.2. She was also found to be anemic and subsequently presented to the ED after the RHC for pRBC transfusion. We have also coordinated for her to receive IV iron locally.       PAST MEDICAL HISTORY:  Severe tricuspid regurgitation with moderately reduced RV function  Permanent atrial fibrillation status post Watchman implantation  GAVE with iron deficiency anemia  CKD stage 3  TIA  Dyslipidemia  Anxiety  Previous tobacco use       PAST SURGICAL HISTORY:  Past  Surgical History:   Procedure Laterality Date     CV RIGHT HEART CATH MEASUREMENTS RECORDED N/A 4/25/2024     LYMPH NODE BIOPSY       TUBAL LIGATION         FAMILY HISTORY:  Family History   Problem Relation Age of Onset     Pulmonary Hypertension Mother        SOCIAL HISTORY:  Social History     Socioeconomic History     Marital status: Legally      Spouse name: Not on file     Number of children: Not on file     Years of education: Not on file     Highest education level: Not on file   Occupational History     Not on file   Tobacco Use     Smoking status: Former     Current packs/day: 0.50     Average packs/day: 0.5 packs/day for 52.6 years (26.3 ttl pk-yrs)     Types: Cigarettes     Start date: 10/1971     Smokeless tobacco: Never   Substance and Sexual Activity     Alcohol use: Not Currently     Drug use: Not Currently     Sexual activity: Not on file   Other Topics Concern     Not on file   Social History Narrative    Retired, 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 Lives alone.     Social Determinants of Health     Financial Resource Strain: Low Risk  (2/11/2024)    Received from [facility], [facility]    Overall Financial Resource Strain (CARDIA)      Difficulty of Paying Living Expenses: Not very hard   Food Insecurity: No Food Insecurity (4/18/2024)    Received from [facility] and [facility]    Hunger Vital Sign      Worried About Running Out of Food in the Last Year: Never true      Ran Out of Food in the Last Year: Never true   Transportation Needs: No Transportation Needs (4/18/2024)    Received from [facility] and [facility]    Transportation Needs      In the past 12 months, has lack of transportation kept you from medical appointments, meetings, work, or from getting medicines or things needed for daily living?: No   Physical Activity: Insufficiently Active (2/11/2024)    Received from [facility] and  [facility], [facility]    Exercise Vital Sign      Days of Exercise per Week: 3 days      Minutes of Exercise per Session: 20 min   Stress: No Stress Concern Present (2/11/2024)    Received from [facility], [facility]    [place] [employer] - Occupational Stress Questionnaire      Feeling of Stress : Only a little   Social Connections: Unknown (2/11/2024)    Received from [facility], [facility]    Social Connection and Isolation Panel [NHANES]      Frequency of Communication with Friends and Family: More than three times a week      Frequency of Social Gatherings with Friends and Family: More than three times a week      Attends Mu-ism Services: Patient declined      Active Member of Clubs or Organizations: No      Attends Club or Organization Meetings: 1 to 4 times per year      Marital Status:    Interpersonal Safety: Not At Risk (4/25/2024)    Received from [facility]    Intimate Partner Violence      Are you in a relationship where you are physically hurt, threatened and/or made to feel afraid?: No   Housing Stability: Low Risk  (4/18/2024)    Received from [facility]    Housing Stability      In the last 12 months, was there a time when you were not able to pay the mortgage or rent on time?: No      In the last 12 months, how many places have you lived?: 1      Number of Places Lived in the Last Year (Outpatient): Not on file      In the last 12 months, how many places have you lived?: 0 to 2      In the last 12 months, was there a time when you did not have a steady place to sleep or slept in a shelter (including now)?: No       CURRENT MEDICATIONS:  Current Outpatient Medications   Medication Sig Dispense Refill     acetaminophen (TYLENOL) 325 MG tablet Take 325-650 mg by mouth       diphenhydrAMINE (BENADRYL) 25 MG capsule  Take 25 mg by mouth every 6 hours as needed for itching or allergies       ferrous gluconate (FERGON) 324 (38 Fe) MG tablet Take 324 mg by mouth every 48 hours       hydrocortisone (CORTIZONE 10) 1 % external lotion by topical route twice daily.       metolazone (ZAROXOLYN) 2.5 MG tablet Take 1 tablet (2.5 mg) by mouth as needed (as directed by cardiology) 10 tablet 3     pantoprazole (PROTONIX) 40 MG EC tablet Take 1 tablet by mouth daily before breakfast       potassium chloride alicia ER (KLOR-CON M20) 20 MEQ CR tablet Take 3 tablets (60 mEq) by mouth daily 240 tablet 3     rOPINIRole (REQUIP) 0.25 MG tablet Take 1 tablet by mouth at bedtime       sertraline (ZOLOFT) 50 MG tablet Take 1 tablet by mouth every morning       sodium chloride (OCEAN) 0.65 % nasal spray Spray 2 sprays in nostril       torsemide (DEMADEX) 20 MG tablet Take 4 tablets (80 mg) by mouth 2 times daily 180 tablet 3     vitamin C (ASCORBIC ACID) 500 MG tablet Take 500 mg by mouth every 48 hours       atorvastatin (LIPITOR) 20 MG tablet Take 1 tablet by mouth at bedtime       No current facility-administered medications for this visit.       EXAM:  BP (!) 88/52 (BP Location: Left arm, Patient Position: Sitting, Cuff Size: Adult Regular)   Pulse 67   Wt 80.1 kg (176 lb 9.6 oz)   SpO2 99%   BMI 29.39 kg/m    General: appears comfortable, alert and articulate  Head: normocephalic, atraumatic  Eyes: anicteric sclera, EOMI  Heart: irregularly irregular, normal S1, loud P2, 3/6 systolic ejection murmur in LLSB, estimated JVP 18 cm, 2+ bilateral radial pulses  Lungs: clear to auscultation bilaterally, no rales or wheezing, on 1 L/min O2 by nasal cannula  Abdomen: distended, bowel sounds present, no hepatosplenomegaly  Extremities: 2-3+ pitting bilateral lower extremity edema to the abdomen  Neurological: normal speech and affect, no gross motor deficits    Labs:  Recent Results (from the past 72 hour(s))   INR    Collection Time: 05/02/24 12:40 PM    Result Value Ref Range    INR 1.28 (H) 0.85 - 1.15   Anti Nuclear Ariela IgG by IFA with Reflex    Collection Time: 05/02/24 12:40 PM   Result Value Ref Range    NORRIS interpretation Positive (A) Negative    NORRIS pattern 1 Centromere     NORRIS titer 1 >1:1280    Lupus Anticoagulant Panel    Collection Time: 05/02/24 12:40 PM   Result Value Ref Range    Thrombin Time 18.0 13.0 - 19.0 Seconds    PTT Ratio 1.64 (H) <1.21    Platelet Neutralization 7 (H) <=0 Seconds    DRVVT Screen Ratio 0.97 <1.08    Lupus Result Positive (A) Negative    Lupus Interpretation       INR is elevated.  APTT ratio is elevated.  Platelet Neutralization is positive.  DRVVT Screen ratio is normal.  Thrombin time is normal.  POSITIVE TEST; THIS PATIENT HAS A  LUPUS ANTICOAGULANT.  Recommend repeat testing in 12 weeks to determine if the Lupus Anticoagulant is persistent or transient, since a transient one does not confer an increased thrombotic risk.  If the patient is on an anticoagulant, recommend repeat testing without anticoagulation interference to rule out a false positive lupus anticoagulant.  Patients with a lupus anticoagulant on warfarin therapy should be considered for monitoring with a factor 2 (factor II) level or chromogenic factor 10 (factor X) assay.  Clinical conditions resulting in a high C-reative protein (CRP) level may result in a positive Lupus Anticoagulant test.  Clinical correlation is recommended.    Aury Lugo MD, PhD  Physicians         Hepatitis B core antibody    Collection Time: 05/02/24 12:40 PM   Result Value Ref Range    Hepatitis B Core Antibody Total Nonreactive Nonreactive   Hepatitis B Surface Antibody    Collection Time: 05/02/24 12:40 PM   Result Value Ref Range    Hepatitis B Surface Antibody Nonreactive     Hepatitis B Surface Antibody Instrument Value <3.50 <8.5 m[IU]/mL   HIV Antigen Antibody Combo    Collection Time: 05/02/24 12:40 PM   Result Value Ref Range    HIV Antigen Antibody Combo  Nonreactive Nonreactive   N terminal pro BNP outpatient    Collection Time: 05/02/24 12:40 PM   Result Value Ref Range    N Terminal Pro BNP Outpatient 6,754 (H) 0 - 900 pg/mL   Magnesium    Collection Time: 05/02/24 12:40 PM   Result Value Ref Range    Magnesium 2.2 1.7 - 2.3 mg/dL   Basic metabolic panel    Collection Time: 05/02/24 12:40 PM   Result Value Ref Range    Sodium 140 135 - 145 mmol/L    Potassium 3.7 3.4 - 5.3 mmol/L    Chloride 99 98 - 107 mmol/L    Carbon Dioxide (CO2) 27 22 - 29 mmol/L    Anion Gap 14 7 - 15 mmol/L    Urea Nitrogen 40.7 (H) 8.0 - 23.0 mg/dL    Creatinine 1.57 (H) 0.51 - 0.95 mg/dL    GFR Estimate 35 (L) >60 mL/min/1.73m2    Calcium 8.5 (L) 8.8 - 10.2 mg/dL    Glucose 99 70 - 99 mg/dL   Basic metabolic panel    Collection Time: 05/02/24 12:40 PM   Result Value Ref Range    Sodium 139 135 - 145 mmol/L    Potassium 3.8 3.4 - 5.3 mmol/L    Chloride 99 98 - 107 mmol/L    Carbon Dioxide (CO2) 24 22 - 29 mmol/L    Anion Gap 16 (H) 7 - 15 mmol/L    Urea Nitrogen 40.1 (H) 8.0 - 23.0 mg/dL    Creatinine 1.60 (H) 0.51 - 0.95 mg/dL    GFR Estimate 34 (L) >60 mL/min/1.73m2    Calcium 8.4 (L) 8.8 - 10.2 mg/dL    Glucose 96 70 - 99 mg/dL   CBC with platelets    Collection Time: 05/02/24  3:11 PM   Result Value Ref Range    WBC Count 6.9 4.0 - 11.0 10e3/uL    RBC Count 2.39 (L) 3.80 - 5.20 10e6/uL    Hemoglobin 6.4 (LL) 11.7 - 15.7 g/dL    Hematocrit 21.5 (L) 35.0 - 47.0 %    MCV 90 78 - 100 fL    MCH 26.8 26.5 - 33.0 pg    MCHC 29.8 (L) 31.5 - 36.5 g/dL    RDW 18.5 (H) 10.0 - 15.0 %    Platelet Count 215 150 - 450 10e3/uL       Echocardiogram 2/23/24 at [facility] (personally reviewed):  Summary:    * The estimated ejection fraction is 65-70%.     * Left ventricular segmental wall motion is normal.     * The right ventricle is moderately enlarged.     * Moderately reduced right ventricular systolic function.     * There is severe tricuspid regurgitation.     * Severe pulmonary hypertension,  estimated pulmonary arterial systolic pressure is  102 mmHg.     * Prior study from 1/10/2024.  [id number] comparison EF still 65%, RV is moderately enlarged with moderately reduced function PA pressures still severely elevated at 100 prior study PA pressures were 90 with dilated RV   with decreased RV function.      High resolution CT chest 2/22/24 at [facility] (personally reviewed):  1. Findings of congestive heart failure   2. Negative for pulmonary fibrosis.  The minor atelectasis and bronchiectasis   doubtful clinical relevance   3. Micronodules do not require follow-up unless the patient high risk than 12   month but these are of doubtful clinical relevance   4. The body wall edema, pleural effusions and ascites reflecting the 3rd spacing and CHF   5. Pulmonary artery hypertension, cardiomegaly and left ventricle exclusion   device   6. Hypoattenuation of liver consistent with steatosis. This associates with metabolic syndrome, hyperinsulinemia, predisposing to steatohepatitis and sequelae including cirrhosis and HCC.      NM lung perfusion scan at [facility] 2/22/24 (personally reviewed):  1. Abnormal clumped radiotracer on ventilation scan which may be secondary to COPD.   2. Multiple matched segmental the fax within the bilateral lungs.  However   distribution favors artifact secondary to retained radiotracer from ventilation   scan rather than true perfusion defects.      Overnight oximetry 2/21/24 at [facility]:  Overnight oximetry was performed at the request of Edmar Flaherty MD, on Evelyne Cutler from February 21, 2024 to February 22, 2024.  The patient was monitored for 9 hours and 8 minutes.  The patient's saturations were below 90% for 15.9% of the tested time.  Supplemental oxygen was added.  Some cyclic desaturations were noted, potentially suggestive of a sleep-related breathing disorder.  The patient ended up on 1 L/minutes nasal cannula.      RHC 4/25/24 at N:  RA: 27  RV: 90/20  PA:  88/30 (55)  PCWP: 27 (challenge to obtain PCWP despite multiple attempts, likely partial wedge)  TD CO/CI: 5.4/2.5  Oli CO/CI: 4.5/2.5  PVR: 5.2    RHC 2/26/24 at [facility]:  RA: 12  RV: 75/12  PA: 75/30 (45)  PCWP: 15  Oli CO/CI: 4.6/2.5  PVR: 6.5     With Flolan 0.008 ng/kg/min (uncertain whether this is correct):  PA: 70/25 (40)  PCWP: 15  CO/CI: 4.9/2.7  PVR: 5.1      Assessment and Plan:     Evelyne Cutler is a very pleasant 73 year old female with a history of severe TR with moderately reduced RV function, permanent atrial fibrillation status post Watchman implantation, GAVE with iron deficiency anemia, and CKD stage 3 who presents for evaluation and management of pulmonary hypertension.     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 Echocardiogram in 2/23/24 showed moderately enlarged RV, moderately reduced RV function, severe TR, and normal LVEF. She had a RHC in 2/2024 at [facility] that showed severe precapillary pulmonary hypertension with RAP 12, mPAP 45 mHg, PCWP 14, CO/CI 4.6/2.5, and PVR 6.5. Flolan 0.008 ng/kg/min was reportedly given (dose lower than anticipated, uncertain whether this was a documentation error) for vasodilatory testing without response. Her evaluation was also notable for elevated NORRIS and centromere antibody.     We repeated a RHC on 4/25/24 that showed elevated biventricular pressures with RA 27, PA 88/30 (55), PCWP 27 (challenging to wedge), TD CO/CI 5.4/2.5, PVR 5.2.     I would like to repeat a RHC to re-evaluate her PH after she is diuresed as she has risk factors for precapillary PH/PAH due to her elevated NORRIS.    She is significantly volume overloaded. Our team is now managing her diuretics until she is closed to euvolemic. She is currently on torsemide 80 mg twice a day. Will increase to torsemide 80 mg qAM, torsemide 80 mg mid-day, and torsemide 40 mg in the evening and recheck labs  early next week. Will also increase her KCl from 60 to 100 mEq daily. Anticipate that she will need further increase dose of diuretics but cautiously increasing due to hypokalemia.      To complete the PH evaluation, we will need to complete PFTs, sleep study, and 6MWT. PFTs and sleep study can be completed nonurgently and locally. She has an appointment with Rheumatology at [facility] on 5/24/24 as she has a positive NORRIS and centromere antibody. Will also refer her to pulmonary rehab in the future.     We had a discussion about the challenges in management of severe TR which contributes to worsening RV function, which then begets more TR. She previously had difficulties with low dose vasodilators. She needs aggressive diuresis to offload the weak RV. Will repeat RHC when closer to euvolemia.    2. Iron deficiency anemia, history of GAVE. Unfortunately she has recurrent anemia requiring frequent pRBCs. Due to frequency of transfusions and patient preference to receive the transfusions locally through [facility], discussed with patient's PCP (Dr. Rankin) and [facility] Cardiology team (Dione Ruiz) and they will monitor CBC locally and coordinate for blood transfusions. Greatly appreciate their assistance.    We have coordinated for her to receive IV iron locally. Recommend consider giving an IV diuretic dose after pRBC transfusion to prevent further volume overload (this is directly communicated with Dr. Rankin).    Also recommended that patient see GI again through [facility] to determine whether to pursue repeat EGD with intervention for GAVE if possible. She last had an EGD in 2/2024. Appreciate Dr. Rankin and Dione Ruiz's assistance with putting in the [facility] GI referral.    3. Permanent atrial fibrillation status post Watchman due to GAVE. Has ongoing bleeding.    Our team will call her weekly to get weights and patient knows to contact us earlier if she gains weight. We will closely monitor her  electrolytes. Follow-up in person with me with labs in 1 month.    It was a pleasure seeing Evelyne Cutler at the [facility]. Please contact me with any questions or concerns that you may have.      The longitudinal plan of care for pulmonary hypertension was addressed during this visit. Due to the added complexity in care, I will continue to support Evelyne Cutler in the subsequent management of this condition(s) and with the ongoing continuity of care of this condition(s).    I spent a total of 60 minutes on the date of service evaluating this patient which included face to face discussion, performing a physical exam, reviewing of the chart to gain information from other providers to obtain further history, personally reviewing prior lab and imaging results, ordering tests and/or medications, and documenting clinical information in the electronic health record.         Sincerely,      Anabel Powell MD, PhD   of Medicine  [facility]  Cardiovascular Division  [facility]

## 2024-05-02 NOTE — NURSING NOTE
Chief Complaint   Patient presents with    Follow Up     follow-up on diuresis     Vitals were taken and medications reconciled.    Chelsea Mart CNA  12:57 PM

## 2024-05-03 ENCOUNTER — TELEPHONE (OUTPATIENT)
Dept: CARDIOLOGY | Facility: CLINIC | Age: 74
End: 2024-05-03
Payer: MEDICARE

## 2024-05-03 DIAGNOSIS — R60.9 EDEMA: Primary | ICD-10-CM

## 2024-05-03 DIAGNOSIS — R06.00 DYSPNEA, UNSPECIFIED: ICD-10-CM

## 2024-05-03 DIAGNOSIS — I27.20 PULMONARY HTN (H): ICD-10-CM

## 2024-05-03 LAB
ANA PAT SER IF-IMP: ABNORMAL
ANA SER QL IF: POSITIVE
ANA TITR SER IF: ABNORMAL {TITER}
ANION GAP SERPL CALCULATED.3IONS-SCNC: 14 MMOL/L (ref 7–15)
ANION GAP SERPL CALCULATED.3IONS-SCNC: 16 MMOL/L (ref 7–15)
BUN SERPL-MCNC: 40.1 MG/DL (ref 8–23)
BUN SERPL-MCNC: 40.7 MG/DL (ref 8–23)
CALCIUM SERPL-MCNC: 8.4 MG/DL (ref 8.8–10.2)
CALCIUM SERPL-MCNC: 8.5 MG/DL (ref 8.8–10.2)
CHLORIDE SERPL-SCNC: 99 MMOL/L (ref 98–107)
CHLORIDE SERPL-SCNC: 99 MMOL/L (ref 98–107)
CREAT SERPL-MCNC: 1.57 MG/DL (ref 0.51–0.95)
CREAT SERPL-MCNC: 1.6 MG/DL (ref 0.51–0.95)
DEPRECATED HCO3 PLAS-SCNC: 24 MMOL/L (ref 22–29)
DEPRECATED HCO3 PLAS-SCNC: 27 MMOL/L (ref 22–29)
DRVVT SCREEN RATIO: 0.97
EGFRCR SERPLBLD CKD-EPI 2021: 34 ML/MIN/1.73M2
EGFRCR SERPLBLD CKD-EPI 2021: 35 ML/MIN/1.73M2
GLUCOSE SERPL-MCNC: 96 MG/DL (ref 70–99)
GLUCOSE SERPL-MCNC: 99 MG/DL (ref 70–99)
HBV CORE AB SERPL QL IA: NONREACTIVE
HBV SURFACE AB SERPL IA-ACNC: <3.5 M[IU]/ML
HBV SURFACE AB SERPL IA-ACNC: NONREACTIVE M[IU]/ML
HIV 1+2 AB+HIV1 P24 AG SERPL QL IA: NONREACTIVE
LA PPP-IMP: POSITIVE
LUPUS INTERPRETATION: ABNORMAL
PLATELET NEUTRALIZATION: 7 SECONDS
POTASSIUM SERPL-SCNC: 3.7 MMOL/L (ref 3.4–5.3)
POTASSIUM SERPL-SCNC: 3.8 MMOL/L (ref 3.4–5.3)
PTT RATIO: 1.64
SODIUM SERPL-SCNC: 139 MMOL/L (ref 135–145)
SODIUM SERPL-SCNC: 140 MMOL/L (ref 135–145)
THROMBIN TIME: 18 SECONDS (ref 13–19)

## 2024-05-03 RX ORDER — TORSEMIDE 20 MG/1
TABLET ORAL
Qty: 540 TABLET | Refills: 3 | Status: SHIPPED | OUTPATIENT
Start: 2024-05-03 | End: 2024-05-06

## 2024-05-03 NOTE — TELEPHONE ENCOUNTER
Called placed to Dr. Rankin PCP at ext [phone]. Spoke with RN. They are assisting in placing blood transfusion orders for today. Requested she call Dr. Powell back to assist with further coordination of care: Needing urgent GI referral, labs on Tuesday: CBC, Mag, BNP, BMP.     Dr. Powell spoke with Dione MONSIVAIS cardiology: She will enter in lab orders for next week and also try and coordinate with Dr. Rankin in regards to blood transfusion until patient can be managed by GI.     Covering CHF number if needed is [phone].     Tanvi Flanagan, RN on 5/3/2024 at 9:29 AM

## 2024-05-06 LAB — IL6 SERPL-MCNC: 44.33 PG/ML

## 2024-05-06 RX ORDER — TORSEMIDE 20 MG/1
80 TABLET ORAL 3 TIMES DAILY
Qty: 540 TABLET | Refills: 3 | Status: SHIPPED | OUTPATIENT
Start: 2024-05-06 | End: 2024-05-08 | Stop reason: ALTCHOICE

## 2024-05-06 NOTE — TELEPHONE ENCOUNTER
Patient updated weight gain from last week. Currently is 181lb today. Update sent to Dr. Powell  Patient should increase torsemide to 80mg TID. Patient should take 1 dose of metolazone 2.5mg today    Called patient back with updated plan. She has labs and iron infusion tomorrow locally.     Tanvi Flanagan RN on 5/6/2024 at 12:43 PM

## 2024-05-08 RX ORDER — BUMETANIDE 1 MG/1
5 TABLET ORAL 3 TIMES DAILY
Qty: 450 TABLET | Refills: 11 | Status: SHIPPED | OUTPATIENT
Start: 2024-05-08

## 2024-05-08 NOTE — TELEPHONE ENCOUNTER
Patient took 2.5mg metolazone on 5/6. She is out of town today but she said her weight went up 1lb. She is around 182lb. She met with PCP yesterday for repeat labs and they are going to monitor her HGB. Repeat was7.     Notified Dr. Powell. Updated plan is to switch from torsemide to bumex 5mg TID. Schedule labs in 1 week to monitor BMP, BNP.     Called patient and updated the plan. She verbalized understanding    Tanvi Flanagan RN on 5/8/2024 at 3:10 PM

## 2024-05-10 NOTE — TELEPHONE ENCOUNTER
Patients daughter called and patient is very weak, lethargic. She had a fall at home and called her daughter. Temitope updated that patient had nausea and vomiting yesterday and was unable to keep any meds down. She is up 7lb, hands, face and abdominal swelling noted. Advised patient to come into ED at the [place]. Patient does not want to travel that far. Patient will proceed to [facility] Inpatient team to call Dr. Powell for IV diuresis plan    Tanvi Flanagan RN on 5/10/2024 at 10:20 AM

## 2024-05-31 ENCOUNTER — TELEPHONE (OUTPATIENT)
Dept: CARDIOLOGY | Facility: CLINIC | Age: 74
End: 2024-05-31
Payer: MEDICARE

## 2024-05-31 NOTE — TELEPHONE ENCOUNTER
Called daughter to follow-up on cancelled visits. Patient is going to transfer her care to [facility] with Dr. Clements as this is more convenient for the patient and family due to travel. They were very happy with our care. Updated we are happy to see them back if needed.     Update sent to Dr. Andre Flanagan, ERIK on 5/31/2024 at 1:01 PM

## 2024-12-15 ENCOUNTER — HEALTH MAINTENANCE LETTER (OUTPATIENT)
Age: 74
End: 2024-12-15

## 2025-06-08 ENCOUNTER — HEALTH MAINTENANCE LETTER (OUTPATIENT)
Age: 75
End: 2025-06-08

## (undated) DEVICE — PACK HEART RIGHT CUSTOM SAN32RHF18

## (undated) DEVICE — KIT RIGHT HEART CATH 60130719

## (undated) DEVICE — WIRE GUIDE 0.025"X150CM EMERALD J TIP 502524

## (undated) DEVICE — Device

## (undated) DEVICE — INTRO SHEATH 7FRX10CM PINNACLE RSS702

## (undated) RX ORDER — POTASSIUM CHLORIDE 1.5 G/1.58G
POWDER, FOR SOLUTION ORAL
Status: DISPENSED
Start: 2024-04-25

## (undated) RX ORDER — LIDOCAINE 40 MG/G
CREAM TOPICAL
Status: DISPENSED
Start: 2024-04-25